# Patient Record
Sex: FEMALE | Race: WHITE | NOT HISPANIC OR LATINO
[De-identification: names, ages, dates, MRNs, and addresses within clinical notes are randomized per-mention and may not be internally consistent; named-entity substitution may affect disease eponyms.]

---

## 2017-09-21 ENCOUNTER — APPOINTMENT (OUTPATIENT)
Dept: OBGYN | Facility: CLINIC | Age: 42
End: 2017-09-21

## 2018-03-05 ENCOUNTER — APPOINTMENT (OUTPATIENT)
Dept: OBGYN | Facility: CLINIC | Age: 43
End: 2018-03-05
Payer: COMMERCIAL

## 2018-03-05 VITALS
BODY MASS INDEX: 26.37 KG/M2 | HEIGHT: 67 IN | DIASTOLIC BLOOD PRESSURE: 76 MMHG | SYSTOLIC BLOOD PRESSURE: 110 MMHG | WEIGHT: 168 LBS

## 2018-03-05 DIAGNOSIS — N92.1 EXCESSIVE AND FREQUENT MENSTRUATION WITH IRREGULAR CYCLE: ICD-10-CM

## 2018-03-05 PROCEDURE — 99213 OFFICE O/P EST LOW 20 MIN: CPT

## 2018-03-06 ENCOUNTER — APPOINTMENT (OUTPATIENT)
Dept: OBGYN | Facility: CLINIC | Age: 43
End: 2018-03-06
Payer: COMMERCIAL

## 2018-03-06 ENCOUNTER — ASOB RESULT (OUTPATIENT)
Age: 43
End: 2018-03-06

## 2018-03-06 VITALS
HEIGHT: 67 IN | SYSTOLIC BLOOD PRESSURE: 101 MMHG | WEIGHT: 168 LBS | DIASTOLIC BLOOD PRESSURE: 68 MMHG | BODY MASS INDEX: 26.37 KG/M2 | HEART RATE: 80 BPM

## 2018-03-06 PROCEDURE — 76830 TRANSVAGINAL US NON-OB: CPT

## 2018-03-06 PROCEDURE — 76857 US EXAM PELVIC LIMITED: CPT

## 2018-03-06 PROCEDURE — 58558Z: CUSTOM

## 2018-03-08 LAB — CORE LAB BIOPSY: NORMAL

## 2018-03-15 ENCOUNTER — OUTPATIENT (OUTPATIENT)
Dept: OUTPATIENT SERVICES | Facility: HOSPITAL | Age: 43
LOS: 1 days | End: 2018-03-15
Payer: COMMERCIAL

## 2018-03-15 DIAGNOSIS — Z98.89 OTHER SPECIFIED POSTPROCEDURAL STATES: Chronic | ICD-10-CM

## 2018-03-15 DIAGNOSIS — Z01.818 ENCOUNTER FOR OTHER PREPROCEDURAL EXAMINATION: ICD-10-CM

## 2018-03-15 LAB
ANION GAP SERPL CALC-SCNC: 11 MMOL/L — SIGNIFICANT CHANGE UP (ref 5–17)
ANISOCYTOSIS BLD QL: SLIGHT — SIGNIFICANT CHANGE UP
APPEARANCE UR: CLEAR — SIGNIFICANT CHANGE UP
BILIRUB UR-MCNC: NEGATIVE — SIGNIFICANT CHANGE UP
BUN SERPL-MCNC: 15 MG/DL — SIGNIFICANT CHANGE UP (ref 8–20)
CALCIUM SERPL-MCNC: 8.9 MG/DL — SIGNIFICANT CHANGE UP (ref 8.6–10.2)
CHLORIDE SERPL-SCNC: 106 MMOL/L — SIGNIFICANT CHANGE UP (ref 98–107)
CO2 SERPL-SCNC: 25 MMOL/L — SIGNIFICANT CHANGE UP (ref 22–29)
COLOR SPEC: YELLOW — SIGNIFICANT CHANGE UP
CREAT SERPL-MCNC: 0.76 MG/DL — SIGNIFICANT CHANGE UP (ref 0.5–1.3)
DIFF PNL FLD: NEGATIVE — SIGNIFICANT CHANGE UP
EOSINOPHIL NFR BLD AUTO: 3 % — SIGNIFICANT CHANGE UP (ref 0–5)
GLUCOSE SERPL-MCNC: 87 MG/DL — SIGNIFICANT CHANGE UP (ref 70–115)
GLUCOSE UR QL: NEGATIVE MG/DL — SIGNIFICANT CHANGE UP
HCG UR QL: NEGATIVE — SIGNIFICANT CHANGE UP
HCT VFR BLD CALC: 35.6 % — LOW (ref 37–47)
HGB BLD-MCNC: 11.2 G/DL — LOW (ref 12–16)
KETONES UR-MCNC: NEGATIVE — SIGNIFICANT CHANGE UP
LEUKOCYTE ESTERASE UR-ACNC: NEGATIVE — SIGNIFICANT CHANGE UP
LYMPHOCYTES # BLD AUTO: 21 % — SIGNIFICANT CHANGE UP (ref 20–55)
MACROCYTES BLD QL: SLIGHT — SIGNIFICANT CHANGE UP
MCHC RBC-ENTMCNC: 28.4 PG — SIGNIFICANT CHANGE UP (ref 27–31)
MCHC RBC-ENTMCNC: 31.5 G/DL — LOW (ref 32–36)
MCV RBC AUTO: 90.4 FL — SIGNIFICANT CHANGE UP (ref 81–99)
MICROCYTES BLD QL: SLIGHT — SIGNIFICANT CHANGE UP
MONOCYTES NFR BLD AUTO: 10 % — SIGNIFICANT CHANGE UP (ref 3–10)
NEUTROPHILS NFR BLD AUTO: 65 % — SIGNIFICANT CHANGE UP (ref 37–73)
NITRITE UR-MCNC: NEGATIVE — SIGNIFICANT CHANGE UP
OVALOCYTES BLD QL SMEAR: SLIGHT — SIGNIFICANT CHANGE UP
PH UR: 8 — SIGNIFICANT CHANGE UP (ref 5–8)
PLAT MORPH BLD: NORMAL — SIGNIFICANT CHANGE UP
PLATELET # BLD AUTO: 165 K/UL — SIGNIFICANT CHANGE UP (ref 150–400)
POIKILOCYTOSIS BLD QL AUTO: SLIGHT — SIGNIFICANT CHANGE UP
POTASSIUM SERPL-MCNC: 4.2 MMOL/L — SIGNIFICANT CHANGE UP (ref 3.5–5.3)
POTASSIUM SERPL-SCNC: 4.2 MMOL/L — SIGNIFICANT CHANGE UP (ref 3.5–5.3)
PROT UR-MCNC: NEGATIVE MG/DL — SIGNIFICANT CHANGE UP
RBC # BLD: 3.94 M/UL — LOW (ref 4.4–5.2)
RBC # FLD: 14.3 % — SIGNIFICANT CHANGE UP (ref 11–15.6)
RBC BLD AUTO: ABNORMAL
SODIUM SERPL-SCNC: 142 MMOL/L — SIGNIFICANT CHANGE UP (ref 135–145)
SP GR SPEC: 1.01 — SIGNIFICANT CHANGE UP (ref 1.01–1.02)
UROBILINOGEN FLD QL: NEGATIVE MG/DL — SIGNIFICANT CHANGE UP
VARIANT LYMPHS # BLD: 1 % — SIGNIFICANT CHANGE UP (ref 0–6)
WBC # BLD: 2.3 K/UL — LOW (ref 4.8–10.8)
WBC # FLD AUTO: 2.3 K/UL — LOW (ref 4.8–10.8)

## 2018-03-15 PROCEDURE — G0463: CPT

## 2018-03-15 PROCEDURE — 93005 ELECTROCARDIOGRAM TRACING: CPT

## 2018-03-15 PROCEDURE — 81003 URINALYSIS AUTO W/O SCOPE: CPT

## 2018-03-15 PROCEDURE — 93010 ELECTROCARDIOGRAM REPORT: CPT

## 2018-03-15 PROCEDURE — 80048 BASIC METABOLIC PNL TOTAL CA: CPT

## 2018-03-15 PROCEDURE — 81025 URINE PREGNANCY TEST: CPT

## 2018-03-15 PROCEDURE — 36415 COLL VENOUS BLD VENIPUNCTURE: CPT

## 2018-03-15 PROCEDURE — 85027 COMPLETE CBC AUTOMATED: CPT

## 2018-03-21 ENCOUNTER — RESULT REVIEW (OUTPATIENT)
Age: 43
End: 2018-03-21

## 2018-09-18 ENCOUNTER — APPOINTMENT (OUTPATIENT)
Dept: OBGYN | Facility: CLINIC | Age: 43
End: 2018-09-18
Payer: COMMERCIAL

## 2018-09-18 VITALS
HEIGHT: 67 IN | WEIGHT: 170 LBS | BODY MASS INDEX: 26.68 KG/M2 | SYSTOLIC BLOOD PRESSURE: 110 MMHG | HEART RATE: 77 BPM | DIASTOLIC BLOOD PRESSURE: 81 MMHG

## 2018-09-18 DIAGNOSIS — R39.9 UNSPECIFIED SYMPTOMS AND SIGNS INVOLVING THE GENITOURINARY SYSTEM: ICD-10-CM

## 2018-09-18 PROCEDURE — 81003 URINALYSIS AUTO W/O SCOPE: CPT | Mod: QW

## 2018-09-18 PROCEDURE — 99213 OFFICE O/P EST LOW 20 MIN: CPT

## 2018-09-20 LAB — BACTERIA UR CULT: NORMAL

## 2020-04-28 ENCOUNTER — APPOINTMENT (OUTPATIENT)
Dept: ENDOCRINOLOGY | Facility: CLINIC | Age: 45
End: 2020-04-28

## 2021-02-08 ENCOUNTER — APPOINTMENT (OUTPATIENT)
Dept: OBGYN | Facility: CLINIC | Age: 46
End: 2021-02-08
Payer: COMMERCIAL

## 2021-02-08 VITALS
BODY MASS INDEX: 26.68 KG/M2 | SYSTOLIC BLOOD PRESSURE: 120 MMHG | WEIGHT: 170 LBS | DIASTOLIC BLOOD PRESSURE: 80 MMHG | HEIGHT: 67 IN

## 2021-02-08 DIAGNOSIS — N64.4 MASTODYNIA: ICD-10-CM

## 2021-02-08 PROCEDURE — 99072 ADDL SUPL MATRL&STAF TM PHE: CPT

## 2021-02-08 PROCEDURE — 99214 OFFICE O/P EST MOD 30 MIN: CPT

## 2021-02-08 NOTE — REVIEW OF SYSTEMS
[Breast Pain] : breast pain [Nipple Changes] : no nipple changes [Nipple Discharge] : no nipple discharge [Negative] : Heme/Lymph

## 2021-02-08 NOTE — PLAN
[FreeTextEntry1] : Multiple small nodules appreciated on CBE, with bilateral tenderness.  Advised Pt of well fitting sports bra when exercising (impact exercise) and a mammogram with RFX US Rx was handed to the Pt and will be followed up.  She will be contacted with the results.  All questions addressed.

## 2021-02-08 NOTE — HISTORY OF PRESENT ILLNESS
[FreeTextEntry1] : 45 year old female presenting to office for bilateral breast discomfort.  She has not been seen in office since 2018 and has not had a mammogram in several years time.  No family Hx of breast CA, but her mother and sister both have fibrocystic breasts.  No abnormal discharge from nipples.  She does exercise on an elliptic and wears a sports bra.

## 2021-04-22 ENCOUNTER — NON-APPOINTMENT (OUTPATIENT)
Age: 46
End: 2021-04-22

## 2021-04-22 ENCOUNTER — APPOINTMENT (OUTPATIENT)
Dept: OPHTHALMOLOGY | Facility: CLINIC | Age: 46
End: 2021-04-22
Payer: COMMERCIAL

## 2021-04-22 PROCEDURE — 92133 CPTRZD OPH DX IMG PST SGM ON: CPT

## 2021-04-22 PROCEDURE — 92083 EXTENDED VISUAL FIELD XM: CPT

## 2021-04-22 PROCEDURE — 99072 ADDL SUPL MATRL&STAF TM PHE: CPT

## 2021-04-22 PROCEDURE — 92004 COMPRE OPH EXAM NEW PT 1/>: CPT

## 2021-04-22 PROCEDURE — 92020 GONIOSCOPY: CPT

## 2021-10-26 ENCOUNTER — APPOINTMENT (OUTPATIENT)
Dept: OPHTHALMOLOGY | Facility: CLINIC | Age: 46
End: 2021-10-26

## 2022-03-28 ENCOUNTER — APPOINTMENT (OUTPATIENT)
Dept: OBGYN | Facility: CLINIC | Age: 47
End: 2022-03-28

## 2022-04-13 ENCOUNTER — APPOINTMENT (OUTPATIENT)
Dept: OBGYN | Facility: CLINIC | Age: 47
End: 2022-04-13

## 2022-09-20 ENCOUNTER — APPOINTMENT (OUTPATIENT)
Dept: OPHTHALMOLOGY | Facility: CLINIC | Age: 47
End: 2022-09-20

## 2023-04-18 ENCOUNTER — APPOINTMENT (OUTPATIENT)
Dept: OBGYN | Facility: CLINIC | Age: 48
End: 2023-04-18
Payer: COMMERCIAL

## 2023-04-18 VITALS
DIASTOLIC BLOOD PRESSURE: 69 MMHG | HEIGHT: 67 IN | SYSTOLIC BLOOD PRESSURE: 105 MMHG | HEART RATE: 52 BPM | WEIGHT: 172 LBS | BODY MASS INDEX: 27 KG/M2

## 2023-04-18 DIAGNOSIS — Z01.419 ENCOUNTER FOR GYNECOLOGICAL EXAMINATION (GENERAL) (ROUTINE) W/OUT ABNORMAL FINDINGS: ICD-10-CM

## 2023-04-18 PROCEDURE — 99396 PREV VISIT EST AGE 40-64: CPT

## 2023-04-18 PROCEDURE — 99213 OFFICE O/P EST LOW 20 MIN: CPT | Mod: 25

## 2023-04-18 NOTE — DISCUSSION/SUMMARY
[FreeTextEntry1] : Pap smear is performed.  Prescription for mammography was provided.\par \par Regarding her uterine prolapse I had an extensive discussion.  Patient has third-degree uterine prolapse and I discussed her options including expectant management versus considering a pessary versus considering surgery.  Pros and cons of each modality were discussed including with a pessary the advantage of being avoidance of surgery but possible development of discharge etc.  For now patient will return for sonogram to assess her uterus and adnexa and will consider her options and advise me.  I also discussed referral to urogynecology if she considers incontinence and issue so they could consider a sling at the time of her surgery if she wishes to have surgery

## 2023-04-18 NOTE — HISTORY OF PRESENT ILLNESS
[FreeTextEntry1] : 48-year-old white female para 1 presents for annual visit as well as with complaint of something falling out.  Patient does not menstruate secondary to a NovaSure ablation in 2018.  She is sexually active with her  and just using withdrawal.  She also occasionally reports incontinence of urine but does not consider an issue.\par \par She has a medical history of seizure disorder and is on multiple medications for this.  Surgical history of breast lift, abdominoplasty and NovaSure ablation.  OB history significant for 1 vaginal delivery.  She is .  She denies tobacco occasional alcohol use no drug use.\par \par Patient has not had a mammogram.  She denies any family history of breast, ovarian, colon, uterine CA.

## 2023-04-19 LAB — HPV HIGH+LOW RISK DNA PNL CVX: NOT DETECTED

## 2023-04-23 LAB — CYTOLOGY CVX/VAG DOC THIN PREP: NORMAL

## 2023-04-25 ENCOUNTER — APPOINTMENT (OUTPATIENT)
Dept: MAMMOGRAPHY | Facility: CLINIC | Age: 48
End: 2023-04-25

## 2023-05-08 ENCOUNTER — APPOINTMENT (OUTPATIENT)
Dept: ANTEPARTUM | Facility: CLINIC | Age: 48
End: 2023-05-08
Payer: COMMERCIAL

## 2023-05-08 ENCOUNTER — ASOB RESULT (OUTPATIENT)
Age: 48
End: 2023-05-08

## 2023-05-08 ENCOUNTER — APPOINTMENT (OUTPATIENT)
Dept: OBGYN | Facility: CLINIC | Age: 48
End: 2023-05-08
Payer: COMMERCIAL

## 2023-05-08 VITALS — WEIGHT: 172 LBS | HEIGHT: 67 IN | BODY MASS INDEX: 27 KG/M2

## 2023-05-08 DIAGNOSIS — N81.4 UTEROVAGINAL PROLAPSE, UNSPECIFIED: ICD-10-CM

## 2023-05-08 PROCEDURE — 76830 TRANSVAGINAL US NON-OB: CPT

## 2023-05-08 PROCEDURE — 76856 US EXAM PELVIC COMPLETE: CPT | Mod: 59

## 2023-05-08 PROCEDURE — 99214 OFFICE O/P EST MOD 30 MIN: CPT

## 2023-05-08 NOTE — HISTORY OF PRESENT ILLNESS
[FreeTextEntry1] : 48-year-old white female para 1 here for follow-up visit for uterine prolapse.  Patient is here for pelvic sonogram and here with her  to discuss management options.  Patient does report occasional stress incontinence.\par \par Ultrasound today reveals a 8.6 x 4.7 cm uterus with two 1 cm intramural myomas.  Both adnexa appear normal.

## 2023-05-08 NOTE — DISCUSSION/SUMMARY
[FreeTextEntry1] : I discussed the findings of the sonogram with the patient.  I discussed fibroids; they are high incidence with extremely low likelihood of malignancy.\par \par Regarding her incontinence and prolapse we had a discussion.  I discussed management options including considering expectant management with no therapy; considering a pessary versus considering surgical options.  Everything was discussed extensively with pros and cons.  With the pessary I discussed learning how to change it yourself to prevent vaginal discharge etc.  Patient adamantly opposed to a pessary but has been wishes for her to try.  In terms of surgical options I discussed in light of her incontinence referral to urogynecology for potential sling procedure at the time of her TVH.  For now patient will consider her options and advise me.  Referral to urogynecology was given.

## 2023-05-25 ENCOUNTER — APPOINTMENT (OUTPATIENT)
Dept: OBGYN | Facility: CLINIC | Age: 48
End: 2023-05-25

## 2023-08-03 ENCOUNTER — APPOINTMENT (OUTPATIENT)
Dept: UROGYNECOLOGY | Facility: CLINIC | Age: 48
End: 2023-08-03

## 2023-08-14 ENCOUNTER — APPOINTMENT (OUTPATIENT)
Dept: UROGYNECOLOGY | Facility: CLINIC | Age: 48
End: 2023-08-14
Payer: COMMERCIAL

## 2023-08-14 PROCEDURE — 51729 CYSTOMETROGRAM W/VP&UP: CPT

## 2023-08-14 PROCEDURE — 51784 ANAL/URINARY MUSCLE STUDY: CPT

## 2023-08-14 PROCEDURE — 51797 INTRAABDOMINAL PRESSURE TEST: CPT

## 2023-08-14 PROCEDURE — 51741 ELECTRO-UROFLOWMETRY FIRST: CPT

## 2023-08-29 PROBLEM — Z86.69 HISTORY OF MENIERE'S DISEASE: Status: RESOLVED | Noted: 2023-08-29 | Resolved: 2023-08-29

## 2023-08-29 PROBLEM — Z78.9 CAFFEINE USE: Status: ACTIVE | Noted: 2023-08-29

## 2023-08-29 PROBLEM — Z86.69 HISTORY OF RESTLESS LEGS SYNDROME: Status: RESOLVED | Noted: 2023-08-29 | Resolved: 2023-08-29

## 2023-08-29 PROBLEM — N81.9 FEMALE GENITAL PROLAPSE, UNSPECIFIED TYPE: Status: ACTIVE | Noted: 2023-08-29

## 2023-08-29 RX ORDER — MULTIVITAMIN
TABLET ORAL
Refills: 0 | Status: ACTIVE | COMMUNITY

## 2023-08-29 RX ORDER — DILTIAZEM HCL 100 MG
VIAL (EA) INTRAVENOUS
Refills: 0 | Status: ACTIVE | COMMUNITY

## 2023-08-29 RX ORDER — MISOPROSTOL 200 UG/1
200 TABLET ORAL
Qty: 2 | Refills: 0 | Status: DISCONTINUED | COMMUNITY
Start: 2018-03-05 | End: 2023-08-29

## 2023-08-29 RX ORDER — CITALOPRAM 40 MG/1
40 TABLET, FILM COATED ORAL
Refills: 0 | Status: ACTIVE | COMMUNITY

## 2023-08-29 RX ORDER — NITROFURANTOIN (MONOHYDRATE/MACROCRYSTALS) 25; 75 MG/1; MG/1
100 CAPSULE ORAL TWICE DAILY
Qty: 10 | Refills: 0 | Status: DISCONTINUED | COMMUNITY
Start: 2018-09-18 | End: 2023-08-29

## 2023-08-30 ENCOUNTER — APPOINTMENT (OUTPATIENT)
Dept: UROGYNECOLOGY | Facility: CLINIC | Age: 48
End: 2023-08-30
Payer: COMMERCIAL

## 2023-08-30 VITALS
SYSTOLIC BLOOD PRESSURE: 121 MMHG | HEIGHT: 66 IN | BODY MASS INDEX: 27.48 KG/M2 | DIASTOLIC BLOOD PRESSURE: 74 MMHG | WEIGHT: 171 LBS

## 2023-08-30 DIAGNOSIS — Z86.69 PERSONAL HISTORY OF OTHER DISEASES OF THE NERVOUS SYSTEM AND SENSE ORGANS: ICD-10-CM

## 2023-08-30 DIAGNOSIS — Z78.9 OTHER SPECIFIED HEALTH STATUS: ICD-10-CM

## 2023-08-30 DIAGNOSIS — N81.9 FEMALE GENITAL PROLAPSE, UNSPECIFIED: ICD-10-CM

## 2023-08-30 PROCEDURE — 99214 OFFICE O/P EST MOD 30 MIN: CPT | Mod: 25

## 2023-08-30 PROCEDURE — 51701 INSERT BLADDER CATHETER: CPT | Mod: 59

## 2023-08-30 NOTE — PROCEDURE
[Straight Catheterization] : insertion of a straight catheter [Stress Incontinence] : stress incontinence [Urinary Frequency] : urinary frequency [___ Fr Straight Tip] : a [unfilled] in [place] straight tip catheter [None] : there were no complications with the catheter insertion [Clear] : clear [No Complications] : no complications [Tolerated Well] : the patient tolerated the procedure well [Post procedure instructions and information given] : Post procedure instructions and information were given and reviewed with patient. [1] : 1 [FreeTextEntry1] : cathed to obtain pvr and uncontam specimen

## 2023-08-30 NOTE — PHYSICAL EXAM
[Chaperone Present] : A chaperone was present in the examining room during all aspects of the physical examination [No Acute Distress] : in no acute distress [Oriented x3] : oriented to person, place, and time [None] : no CVA tenderness [Labia Majora] : were normal [Labia Minora] : were normal [Bartholin's Gland] : both Bartholin's glands were normal  [Normal Appearance] : general appearance was normal [No Bleeding] : there was no active vaginal bleeding [2] : 2 [Aa ____] : Aa [unfilled] [Ba ____] : Ba [unfilled] [C ____] : C [unfilled] [GH ____] : GH [unfilled] [PB ____] : PB [unfilled] [TVL ____] : TVL  [unfilled] [Ap ____] : Ap [unfilled] [Bp ____] : Bp [unfilled] [D ____] : D [unfilled] [] : I [Normal] : normal [Soft] :  the cervix was soft [Post Void Residual ____ml] : post void residual was [unfilled] ml [Exam Deferred] : was deferred [Tenderness] : ~T no ~M abdominal tenderness observed [Distended] : not distended [FreeTextEntry3] : CST neg [de-identified] : POP all apical

## 2023-08-30 NOTE — OB HISTORY
[Total Preg ___] : : [unfilled] [Living ___] : [unfilled] (living) [Vaginal ___] : [unfilled] vaginal delivery(s) [unknown] : the patient is unsure of the date of her LMP [Last Pap Smear ___] : date of last pap smear was on [unfilled] [Sexually Active] : sexually active [Abnormal Pap Smear] : normal pap smear [Taking Estrogens] : is not taking estrogen replacement

## 2023-08-30 NOTE — ASSESSMENT
[FreeTextEntry1] : Stage II uterine POP. The patient has pelvic organ prolapse. Management options including observation, kegels with or without PT, biofeedback, pessary, and surgery were reviewed. Pessary care was reviewed. Surg options obliterative vs reconstructive, major vs minor, abd / robotic vs vaginal, with or without hysterectomy, with or without graft use discussed. She is interested in surg. RTO for cysto. Plan: [] cysto  [] PRA - RASCH/SCP > TVH/USLS, consider scars on abd, consider MUS due to symptoms but neg UDS

## 2023-08-30 NOTE — HISTORY OF PRESENT ILLNESS
[FreeTextEntry1] : Years of bothersome bulge and pressure from vagina. No intervention yet. Not desiring further fertility. No UI. Freq at times, leakage only with sneeze small and occasional-no pads. No UUI. No gross hematuria. Normal bowel movements. Seizure d/o, friend Annemarie here per request.  PSH appendectomy, brain surgery, scar removal, breast lift, mini-abdominoplasty PMH seizure d/o, Meniere's disease Not a smoker NKDA  UDS - normal PVR, normal low-end capacity, no DO, no GSUI. TVUS - normal ovs, no FF, ut 1 fib and ut 9 x 4 x 5 cm

## 2023-09-19 ENCOUNTER — APPOINTMENT (OUTPATIENT)
Dept: UROGYNECOLOGY | Facility: CLINIC | Age: 48
End: 2023-09-19
Payer: COMMERCIAL

## 2023-09-19 VITALS
DIASTOLIC BLOOD PRESSURE: 83 MMHG | WEIGHT: 171 LBS | HEART RATE: 51 BPM | OXYGEN SATURATION: 99 % | SYSTOLIC BLOOD PRESSURE: 134 MMHG | HEIGHT: 66 IN | BODY MASS INDEX: 27.48 KG/M2

## 2023-09-19 LAB
BILIRUB UR QL STRIP: NEGATIVE
CLARITY UR: CLEAR
GLUCOSE UR-MCNC: NEGATIVE
HCG UR QL: 0.2 EU/DL
HGB UR QL STRIP.AUTO: NEGATIVE
KETONES UR-MCNC: NEGATIVE
LEUKOCYTE ESTERASE UR QL STRIP: NEGATIVE
NITRITE UR QL STRIP: NEGATIVE
PH UR STRIP: 6.5
PROT UR STRIP-MCNC: NEGATIVE
SP GR UR STRIP: 1.01

## 2023-09-19 PROCEDURE — 99214 OFFICE O/P EST MOD 30 MIN: CPT

## 2023-09-19 PROCEDURE — 81003 URINALYSIS AUTO W/O SCOPE: CPT | Mod: QW

## 2023-09-29 ENCOUNTER — APPOINTMENT (OUTPATIENT)
Dept: UROGYNECOLOGY | Facility: CLINIC | Age: 48
End: 2023-09-29

## 2023-10-13 ENCOUNTER — NON-APPOINTMENT (OUTPATIENT)
Age: 48
End: 2023-10-13

## 2023-12-06 ENCOUNTER — APPOINTMENT (OUTPATIENT)
Dept: FAMILY MEDICINE | Facility: CLINIC | Age: 48
End: 2023-12-06
Payer: COMMERCIAL

## 2023-12-06 ENCOUNTER — NON-APPOINTMENT (OUTPATIENT)
Age: 48
End: 2023-12-06

## 2023-12-06 VITALS
RESPIRATION RATE: 16 BRPM | WEIGHT: 172 LBS | DIASTOLIC BLOOD PRESSURE: 69 MMHG | HEART RATE: 76 BPM | BODY MASS INDEX: 27.64 KG/M2 | TEMPERATURE: 97 F | HEIGHT: 66 IN | SYSTOLIC BLOOD PRESSURE: 108 MMHG | OXYGEN SATURATION: 98 %

## 2023-12-06 DIAGNOSIS — R32 UNSPECIFIED URINARY INCONTINENCE: ICD-10-CM

## 2023-12-06 DIAGNOSIS — Z76.89 PERSONS ENCOUNTERING HEALTH SERVICES IN OTHER SPECIFIED CIRCUMSTANCES: ICD-10-CM

## 2023-12-06 DIAGNOSIS — G40.909 EPILEPSY, UNSPECIFIED, NOT INTRACTABLE, W/OUT STATUS EPILEPTICUS: ICD-10-CM

## 2023-12-06 DIAGNOSIS — Z82.49 FAMILY HISTORY OF ISCHEMIC HEART DISEASE AND OTHER DISEASES OF THE CIRCULATORY SYSTEM: ICD-10-CM

## 2023-12-06 DIAGNOSIS — R35.0 FREQUENCY OF MICTURITION: ICD-10-CM

## 2023-12-06 DIAGNOSIS — Z13.31 ENCOUNTER FOR SCREENING FOR DEPRESSION: ICD-10-CM

## 2023-12-06 PROCEDURE — G0444 DEPRESSION SCREEN ANNUAL: CPT | Mod: 59

## 2023-12-06 PROCEDURE — 99203 OFFICE O/P NEW LOW 30 MIN: CPT | Mod: 25

## 2023-12-06 PROCEDURE — 96160 PT-FOCUSED HLTH RISK ASSMT: CPT | Mod: 59

## 2023-12-06 RX ORDER — LACOSAMIDE 200 MG/1
200 TABLET, FILM COATED ORAL
Refills: 0 | Status: ACTIVE | COMMUNITY
Start: 2023-12-06

## 2024-01-04 ENCOUNTER — NON-APPOINTMENT (OUTPATIENT)
Age: 49
End: 2024-01-04

## 2024-01-04 ENCOUNTER — APPOINTMENT (OUTPATIENT)
Dept: FAMILY MEDICINE | Facility: CLINIC | Age: 49
End: 2024-01-04
Payer: COMMERCIAL

## 2024-01-04 VITALS
TEMPERATURE: 98.3 F | DIASTOLIC BLOOD PRESSURE: 73 MMHG | OXYGEN SATURATION: 100 % | HEIGHT: 66 IN | RESPIRATION RATE: 16 BRPM | HEART RATE: 55 BPM | SYSTOLIC BLOOD PRESSURE: 110 MMHG

## 2024-01-04 DIAGNOSIS — Z00.00 ENCOUNTER FOR GENERAL ADULT MEDICAL EXAMINATION W/OUT ABNORMAL FINDINGS: ICD-10-CM

## 2024-01-04 DIAGNOSIS — Z13.220 ENCOUNTER FOR SCREENING FOR LIPOID DISORDERS: ICD-10-CM

## 2024-01-04 DIAGNOSIS — Z13.0 ENCOUNTER FOR SCREENING FOR DISEASES OF THE BLOOD AND BLOOD-FORMING ORGANS AND CERTAIN DISORDERS INVOLVING THE IMMUNE MECHANISM: ICD-10-CM

## 2024-01-04 PROCEDURE — 93000 ELECTROCARDIOGRAM COMPLETE: CPT

## 2024-01-04 PROCEDURE — 99396 PREV VISIT EST AGE 40-64: CPT | Mod: 25

## 2024-01-04 PROCEDURE — 96160 PT-FOCUSED HLTH RISK ASSMT: CPT

## 2024-01-04 NOTE — HEALTH RISK ASSESSMENT
[Good] : ~his/her~  mood as  good [Patient reported mammogram was normal] : Patient reported mammogram was normal [Patient reported PAP Smear was normal] : Patient reported PAP Smear was normal [None] : None [With Family] : lives with family [Employed] : employed [] :  [# Of Children ___] : has [unfilled] children [Feels Safe at Home] : Feels safe at home [Fully functional (bathing, dressing, toileting, transferring, walking, feeding)] : Fully functional (bathing, dressing, toileting, transferring, walking, feeding) [Reports normal functional visual acuity (ie: able to read med bottle)] : Reports normal functional visual acuity [Yes] : Yes [Monthly or less (1 pt)] : Monthly or less (1 point) [1 or 2 (0 pts)] : 1 or 2 (0 points) [Never (0 pts)] : Never (0 points) [No] : In the past 12 months have you used drugs other than those required for medical reasons? No [No falls in past year] : Patient reported no falls in the past year [Smoke Detector] : smoke detector [Carbon Monoxide Detector] : carbon monoxide detector [Guns at Home] : guns at home [Seat Belt] :  uses seat belt [Sunscreen] : uses sunscreen [de-identified] : n/a [de-identified] : HEME ([facility]), NEURO (Dr. Fischer)  [de-identified] : claudy,  [de-identified] : well-balanced,  [Change in mental status noted] : No change in mental status noted [Reports changes in hearing] : Reports no changes in hearing [Reports changes in vision] : Reports no changes in vision [Reports changes in dental health] : Reports no changes in dental health [MammogramDate] : 08/2023 [PapSmearDate] : 04/2023 [de-identified] : locked away safely [Never] : Never

## 2024-01-04 NOTE — COUNSELING
[FreeTextEntry1] : seizure precautions  [Behavioral health counseling provided] : Behavioral health counseling provided [Sleep ___ hours/day] : Sleep [unfilled] hours/day [Engage in a relaxing activity] : Engage in a relaxing activity [Plan in advance] : Plan in advance [Encouraged to maintain food diary] : Encouraged to maintain food diary [Encouraged to increase physical activity] : Encouraged to increase physical activity [____ min/wk Activity] : [unfilled] min/wk activity [Keep Food Diary] : keep food diary [None] : None [Good understanding] : Patient has a good understanding of lifestyle changes and steps needed to achieve self management goal

## 2024-01-04 NOTE — REVIEW OF SYSTEMS
[Dysuria] : no dysuria [Hematuria] : no hematuria [Muscle Pain] : no muscle pain [Headache] : no headache [Anxiety] : no anxiety [Depression] : no depression [Swollen Glands] : no swollen glands [Negative] : Gastrointestinal

## 2024-01-04 NOTE — ASSESSMENT
[FreeTextEntry1] : Annual physical/wellness visit performed today Routine measurements including height, weight, BMI, and blood pressure performed. Medications reviewed and reconciled.  Tobacco, alcohol, and drug screening performed.  Annual depression screening performed.  Diet and exercise discussed.   In office EKG performed: Sinus karen at 54bpm, NAD, normal intervals, no acute ST/TW  changes noted.

## 2024-01-04 NOTE — HISTORY OF PRESENT ILLNESS
[FreeTextEntry1] : CPE [de-identified] : Ms. BRAEDEN COHEN is a 48 year old female who presents to the office for CPE  Diet and exercise discussed.

## 2024-01-04 NOTE — SIGNATURES
[TextEntry] : Shanae Monae D.O.  Family Medicine Physician [place] [address] [phone]

## 2024-01-26 NOTE — COUNSELING
Detail Level: Detailed [Breast Self Exam] : breast self exam Add 1585x Cpt? (Do Not Bill If You Billed For The Procedure Placing The Sutures. This Is An Add-On Code That Must Be Billed With An E/M Visit Code): No

## 2024-02-16 ENCOUNTER — APPOINTMENT (OUTPATIENT)
Dept: UROGYNECOLOGY | Facility: CLINIC | Age: 49
End: 2024-02-16

## 2024-02-28 ENCOUNTER — APPOINTMENT (OUTPATIENT)
Dept: UROGYNECOLOGY | Facility: CLINIC | Age: 49
End: 2024-02-28

## 2024-03-05 ENCOUNTER — RX RENEWAL (OUTPATIENT)
Age: 49
End: 2024-03-05

## 2024-03-21 ENCOUNTER — APPOINTMENT (OUTPATIENT)
Dept: OPHTHALMOLOGY | Facility: CLINIC | Age: 49
End: 2024-03-21
Payer: COMMERCIAL

## 2024-03-21 ENCOUNTER — APPOINTMENT (OUTPATIENT)
Dept: OPHTHALMOLOGY | Facility: CLINIC | Age: 49
End: 2024-03-21

## 2024-03-21 ENCOUNTER — NON-APPOINTMENT (OUTPATIENT)
Age: 49
End: 2024-03-21

## 2024-03-21 PROCEDURE — 92014 COMPRE OPH EXAM EST PT 1/>: CPT

## 2024-03-21 PROCEDURE — 92020 GONIOSCOPY: CPT

## 2024-05-10 ENCOUNTER — RX RENEWAL (OUTPATIENT)
Age: 49
End: 2024-05-10

## 2024-05-22 ENCOUNTER — OFFICE (OUTPATIENT)
Dept: URBAN - METROPOLITAN AREA CLINIC 115 | Facility: CLINIC | Age: 49
Setting detail: OPHTHALMOLOGY
End: 2024-05-22
Payer: COMMERCIAL

## 2024-05-22 DIAGNOSIS — H53.433: ICD-10-CM

## 2024-05-22 DIAGNOSIS — H35.362: ICD-10-CM

## 2024-05-22 PROCEDURE — 92004 COMPRE OPH EXAM NEW PT 1/>: CPT | Performed by: OPHTHALMOLOGY

## 2024-05-22 PROCEDURE — 92133 CPTRZD OPH DX IMG PST SGM ON: CPT | Performed by: OPHTHALMOLOGY

## 2024-05-22 PROCEDURE — 92083 EXTENDED VISUAL FIELD XM: CPT | Performed by: OPHTHALMOLOGY

## 2024-05-22 ASSESSMENT — CONFRONTATIONAL VISUAL FIELD TEST (CVF)
OD_FINDINGS: FULL
OS_FINDINGS: FULL

## 2024-06-17 RX ORDER — LEVOTHYROXINE SODIUM 0.09 MG/1
88 TABLET ORAL
Qty: 90 | Refills: 0 | Status: ACTIVE | COMMUNITY
Start: 1900-01-01 | End: 1900-01-01

## 2024-06-26 ENCOUNTER — OFFICE (OUTPATIENT)
Dept: URBAN - METROPOLITAN AREA CLINIC 115 | Facility: CLINIC | Age: 49
Setting detail: OPHTHALMOLOGY
End: 2024-06-26
Payer: COMMERCIAL

## 2024-06-26 DIAGNOSIS — H50.10: ICD-10-CM

## 2024-06-26 DIAGNOSIS — H35.362: ICD-10-CM

## 2024-06-26 DIAGNOSIS — H53.433: ICD-10-CM

## 2024-06-26 PROCEDURE — 92012 INTRM OPH EXAM EST PATIENT: CPT | Performed by: OPHTHALMOLOGY

## 2024-06-26 PROCEDURE — 92060 SENSORIMOTOR EXAMINATION: CPT | Performed by: OPHTHALMOLOGY

## 2024-06-26 PROCEDURE — 92083 EXTENDED VISUAL FIELD XM: CPT | Performed by: OPHTHALMOLOGY

## 2024-06-26 ASSESSMENT — CONFRONTATIONAL VISUAL FIELD TEST (CVF)
OD_FINDINGS: FULL
OS_FINDINGS: FULL

## 2024-07-18 ENCOUNTER — OUTPATIENT (OUTPATIENT)
Dept: OUTPATIENT SERVICES | Facility: HOSPITAL | Age: 49
LOS: 1 days | End: 2024-07-18
Payer: COMMERCIAL

## 2024-07-18 VITALS
HEART RATE: 62 BPM | SYSTOLIC BLOOD PRESSURE: 110 MMHG | RESPIRATION RATE: 16 BRPM | TEMPERATURE: 97 F | OXYGEN SATURATION: 97 % | WEIGHT: 167.55 LBS | DIASTOLIC BLOOD PRESSURE: 74 MMHG | HEIGHT: 66 IN

## 2024-07-18 DIAGNOSIS — Z98.890 OTHER SPECIFIED POSTPROCEDURAL STATES: Chronic | ICD-10-CM

## 2024-07-18 DIAGNOSIS — G40.909 EPILEPSY, UNSPECIFIED, NOT INTRACTABLE, WITHOUT STATUS EPILEPTICUS: ICD-10-CM

## 2024-07-18 DIAGNOSIS — Z98.89 OTHER SPECIFIED POSTPROCEDURAL STATES: Chronic | ICD-10-CM

## 2024-07-18 DIAGNOSIS — Z90.49 ACQUIRED ABSENCE OF OTHER SPECIFIED PARTS OF DIGESTIVE TRACT: Chronic | ICD-10-CM

## 2024-07-18 DIAGNOSIS — N39.3 STRESS INCONTINENCE (FEMALE) (MALE): ICD-10-CM

## 2024-07-18 DIAGNOSIS — N81.9 FEMALE GENITAL PROLAPSE, UNSPECIFIED: ICD-10-CM

## 2024-07-18 DIAGNOSIS — E03.9 HYPOTHYROIDISM, UNSPECIFIED: ICD-10-CM

## 2024-07-18 DIAGNOSIS — Z01.818 ENCOUNTER FOR OTHER PREPROCEDURAL EXAMINATION: ICD-10-CM

## 2024-07-18 DIAGNOSIS — Z29.9 ENCOUNTER FOR PROPHYLACTIC MEASURES, UNSPECIFIED: ICD-10-CM

## 2024-07-18 DIAGNOSIS — N81.6 RECTOCELE: ICD-10-CM

## 2024-07-18 LAB
A1C WITH ESTIMATED AVERAGE GLUCOSE RESULT: 5.3 % — SIGNIFICANT CHANGE UP (ref 4–5.6)
ALBUMIN SERPL ELPH-MCNC: 4.1 G/DL — SIGNIFICANT CHANGE UP (ref 3.3–5.2)
ALP SERPL-CCNC: 49 U/L — SIGNIFICANT CHANGE UP (ref 40–120)
ALT FLD-CCNC: 11 U/L — SIGNIFICANT CHANGE UP
ANION GAP SERPL CALC-SCNC: 9 MMOL/L — SIGNIFICANT CHANGE UP (ref 5–17)
APPEARANCE UR: CLEAR — SIGNIFICANT CHANGE UP
APTT BLD: 31.6 SEC — SIGNIFICANT CHANGE UP (ref 24.5–35.6)
AST SERPL-CCNC: 16 U/L — SIGNIFICANT CHANGE UP
BASOPHILS # BLD AUTO: 0.02 K/UL — SIGNIFICANT CHANGE UP (ref 0–0.2)
BASOPHILS NFR BLD AUTO: 0.8 % — SIGNIFICANT CHANGE UP (ref 0–2)
BILIRUB SERPL-MCNC: 0.3 MG/DL — LOW (ref 0.4–2)
BILIRUB UR-MCNC: NEGATIVE — SIGNIFICANT CHANGE UP
BLD GP AB SCN SERPL QL: SIGNIFICANT CHANGE UP
BUN SERPL-MCNC: 14.2 MG/DL — SIGNIFICANT CHANGE UP (ref 8–20)
CALCIUM SERPL-MCNC: 8.8 MG/DL — SIGNIFICANT CHANGE UP (ref 8.4–10.5)
CHLORIDE SERPL-SCNC: 102 MMOL/L — SIGNIFICANT CHANGE UP (ref 96–108)
CO2 SERPL-SCNC: 27 MMOL/L — SIGNIFICANT CHANGE UP (ref 22–29)
COLOR SPEC: YELLOW — SIGNIFICANT CHANGE UP
CREAT SERPL-MCNC: 0.86 MG/DL — SIGNIFICANT CHANGE UP (ref 0.5–1.3)
DIFF PNL FLD: NEGATIVE — SIGNIFICANT CHANGE UP
EGFR: 84 ML/MIN/1.73M2 — SIGNIFICANT CHANGE UP
EOSINOPHIL # BLD AUTO: 0.06 K/UL — SIGNIFICANT CHANGE UP (ref 0–0.5)
EOSINOPHIL NFR BLD AUTO: 2.5 % — SIGNIFICANT CHANGE UP (ref 0–6)
ESTIMATED AVERAGE GLUCOSE: 105 MG/DL — SIGNIFICANT CHANGE UP (ref 68–114)
GLUCOSE SERPL-MCNC: 82 MG/DL — SIGNIFICANT CHANGE UP (ref 70–99)
GLUCOSE UR QL: NEGATIVE MG/DL — SIGNIFICANT CHANGE UP
HCT VFR BLD CALC: 39.4 % — SIGNIFICANT CHANGE UP (ref 34.5–45)
HGB BLD-MCNC: 12.9 G/DL — SIGNIFICANT CHANGE UP (ref 11.5–15.5)
IMM GRANULOCYTES NFR BLD AUTO: 0 % — SIGNIFICANT CHANGE UP (ref 0–0.9)
INR BLD: 1.15 RATIO — SIGNIFICANT CHANGE UP (ref 0.85–1.18)
KETONES UR-MCNC: NEGATIVE MG/DL — SIGNIFICANT CHANGE UP
LEUKOCYTE ESTERASE UR-ACNC: NEGATIVE — SIGNIFICANT CHANGE UP
LYMPHOCYTES # BLD AUTO: 0.71 K/UL — LOW (ref 1–3.3)
LYMPHOCYTES # BLD AUTO: 29.3 % — SIGNIFICANT CHANGE UP (ref 13–44)
MCHC RBC-ENTMCNC: 31.5 PG — SIGNIFICANT CHANGE UP (ref 27–34)
MCHC RBC-ENTMCNC: 32.7 GM/DL — SIGNIFICANT CHANGE UP (ref 32–36)
MCV RBC AUTO: 96.3 FL — SIGNIFICANT CHANGE UP (ref 80–100)
MONOCYTES # BLD AUTO: 0.21 K/UL — SIGNIFICANT CHANGE UP (ref 0–0.9)
MONOCYTES NFR BLD AUTO: 8.7 % — SIGNIFICANT CHANGE UP (ref 2–14)
NEUTROPHILS # BLD AUTO: 1.42 K/UL — LOW (ref 1.8–7.4)
NEUTROPHILS NFR BLD AUTO: 58.7 % — SIGNIFICANT CHANGE UP (ref 43–77)
NITRITE UR-MCNC: NEGATIVE — SIGNIFICANT CHANGE UP
PH UR: 6 — SIGNIFICANT CHANGE UP (ref 5–8)
PLATELET # BLD AUTO: 122 K/UL — LOW (ref 150–400)
POTASSIUM SERPL-MCNC: 4.9 MMOL/L — SIGNIFICANT CHANGE UP (ref 3.5–5.3)
POTASSIUM SERPL-SCNC: 4.9 MMOL/L — SIGNIFICANT CHANGE UP (ref 3.5–5.3)
PROT SERPL-MCNC: 7.7 G/DL — SIGNIFICANT CHANGE UP (ref 6.6–8.7)
PROT UR-MCNC: NEGATIVE MG/DL — SIGNIFICANT CHANGE UP
PROTHROM AB SERPL-ACNC: 12.7 SEC — SIGNIFICANT CHANGE UP (ref 9.5–13)
RBC # BLD: 4.09 M/UL — SIGNIFICANT CHANGE UP (ref 3.8–5.2)
RBC # FLD: 13 % — SIGNIFICANT CHANGE UP (ref 10.3–14.5)
SODIUM SERPL-SCNC: 138 MMOL/L — SIGNIFICANT CHANGE UP (ref 135–145)
SP GR SPEC: 1.01 — SIGNIFICANT CHANGE UP (ref 1–1.03)
UROBILINOGEN FLD QL: 0.2 MG/DL — SIGNIFICANT CHANGE UP (ref 0.2–1)
WBC # BLD: 2.42 K/UL — LOW (ref 3.8–10.5)
WBC # FLD AUTO: 2.42 K/UL — LOW (ref 3.8–10.5)

## 2024-07-18 PROCEDURE — 85730 THROMBOPLASTIN TIME PARTIAL: CPT

## 2024-07-18 PROCEDURE — 87086 URINE CULTURE/COLONY COUNT: CPT

## 2024-07-18 PROCEDURE — 83036 HEMOGLOBIN GLYCOSYLATED A1C: CPT

## 2024-07-18 PROCEDURE — 80053 COMPREHEN METABOLIC PANEL: CPT

## 2024-07-18 PROCEDURE — 81003 URINALYSIS AUTO W/O SCOPE: CPT

## 2024-07-18 PROCEDURE — 86850 RBC ANTIBODY SCREEN: CPT

## 2024-07-18 PROCEDURE — G0463: CPT

## 2024-07-18 PROCEDURE — 36415 COLL VENOUS BLD VENIPUNCTURE: CPT

## 2024-07-18 PROCEDURE — 86901 BLOOD TYPING SEROLOGIC RH(D): CPT

## 2024-07-18 PROCEDURE — 85610 PROTHROMBIN TIME: CPT

## 2024-07-18 PROCEDURE — 86900 BLOOD TYPING SEROLOGIC ABO: CPT

## 2024-07-18 PROCEDURE — 85025 COMPLETE CBC W/AUTO DIFF WBC: CPT

## 2024-07-18 RX ORDER — ACETAMINOPHEN 500 MG
975 TABLET ORAL ONCE
Refills: 0 | Status: COMPLETED | OUTPATIENT
Start: 2024-08-07 | End: 2024-08-07

## 2024-07-18 RX ORDER — BACTERIOSTATIC SODIUM CHLORIDE 0.9 %
3 VIAL (ML) INJECTION ONCE
Refills: 0 | Status: DISCONTINUED | OUTPATIENT
Start: 2024-08-07 | End: 2024-08-07

## 2024-07-18 RX ORDER — CELECOXIB 200 MG/1
400 CAPSULE ORAL ONCE
Refills: 0 | Status: COMPLETED | OUTPATIENT
Start: 2024-08-07 | End: 2024-08-07

## 2024-07-18 RX ORDER — CEFAZOLIN SODIUM 10 G
2000 VIAL (EA) INJECTION ONCE
Refills: 0 | Status: DISCONTINUED | OUTPATIENT
Start: 2024-08-07 | End: 2024-08-07

## 2024-07-18 RX ORDER — METRONIDAZOLE 500 MG/1
500 TABLET ORAL ONCE
Refills: 0 | Status: DISCONTINUED | OUTPATIENT
Start: 2024-08-07 | End: 2024-08-07

## 2024-07-18 NOTE — H&P PST ADULT - NSICDXPROCEDURE_GEN_ALL_CORE_FT
PROCEDURES:  Robot-assisted laparoscopic supracervical hysterectomy with cystoscopy 18-Jul-2024 12:12:09  Genia Santos  Bilateral salpingectomy 18-Jul-2024 12:13:12  Genia Santos  Laparoscopic sacrocolpopexy with midurethral sling 18-Jul-2024 12:13:31  Genia Santos

## 2024-07-18 NOTE — H&P PST ADULT - NSICDXPASTMEDICALHX_GEN_ALL_CORE_FT
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

## 2024-07-18 NOTE — H&P PST ADULT - GASTROINTESTINAL
negative soft/nontender/nondistended/normal active bowel sounds/no guarding/no rigidity/no palpable sam

## 2024-07-18 NOTE — H&P PST ADULT - PROBLEM SELECTOR PLAN 3
-Patient instructed to take Citalopram, Vimpat, Zonigram, and Onfi  the morning of surgery with a sip of water  -Follow up with neurologist for routine management

## 2024-07-18 NOTE — H&P PST ADULT - HISTORY OF PRESENT ILLNESS
My uterus dropped experiencing increased urinary frequency, stress incontinence, mild dysuria, 1 UTI per year  uterine ablation 2019 for heavy menstrual bleeding - no cycle since     internal US revealed multiple uterine cysts ?    denies pain or bleeding after intercourse     My uterus dropped experiencing increased urinary frequency, stress incontinence, mild dysuria, 1 UTI per year  uterine ablation 2019 for heavy menstrual bleeding - no cycle since     internal US revealed multiple uterine cysts ?    denies pain or bleeding after intercourse  Last episode of seizure . Pt under the care of Dr. Boss (Neurologist) Jyotsna Ross is a 49 year old female with PMH of hypothyroidism, restless leg syndrome, epilepsy (last seizure ) and mesial temporal sclerosis s/p brain surgery (). Patient is , s/p uterine ablation in 2019 for heavy menstrual bleeding. Patient reports experiencing increased urinary frequency, stress incontinence, and mild dysuria for several years with progressive worsening. Pt admits to occasional UTI's but no more than 1 per year. Patient states she went to her GYN at which time an testing revealed a prolapsed uterus. Patient denies vaginal bleeding or discharge, dyspareunia, abdominal pain, constipation, n/v/d. Patient was seen today at PST with genital prolapse, rectocele, stress incontinence for scheduled robotic supracervical hysterectomy with bilateral salpingectomy, sacrocolpopexy, anterior and posterior repair, midurethral sling and cystoscopy with Dr. Osuna on 24.

## 2024-07-18 NOTE — H&P PST ADULT - NEUROLOGICAL
details… normal/cranial nerves II-XII intact/sensation intact/responds to verbal commands/no spontaneous movement

## 2024-07-18 NOTE — H&P PST ADULT - NSICDXPASTSURGICALHX_GEN_ALL_CORE_FT
PAST SURGICAL HISTORY:  S/P brain surgery      PAST SURGICAL HISTORY:  H/O abdominoplasty     History of appendectomy     History of breast lift     S/P brain surgery

## 2024-07-18 NOTE — H&P PST ADULT - NSICDXFAMILYHX_GEN_ALL_CORE_FT
FAMILY HISTORY:  Family history of MS (multiple sclerosis)  Family history of rheumatoid arthritis

## 2024-07-18 NOTE — H&P PST ADULT - ASSESSMENT
Jyotsna Ross is a 49 year old female, , with genital prolapse, rectocele, and stress incontinence scheduled for a robotic supracervical hysterectomy with bilateral salpingectomy, sacrocolpopexy, anterior and posterior repair, midurethral sling and cystoscopy with Dr. Osuna on 24.       -Medical and neurology evaluation pending  -Patient educated on ERP protocol (written/verbal)   -Educated on NSAIDS, multivitamins and herbals that increase the risk of bleeding and need to be stopped 5 days before procedure  -Patient instructed to take Citalopram, Vimpat, Zonigram, Onfi, and Levothyroxine the morning of surgery with a sip of water  -Educated on infection prevention  -Tylenol can be taken if needed for pain  -Will continue all other medications as prescribed  -Verbalized understanding of all instructions.    CAPRINI SCORE    AGE RELATED RISK FACTORS                                                             [x ] Age 41-60 years                                            (1 Point)  [ ] Age: 61-74 years                                           (2 Points)                 [ ] Age= 75 years                                                (3 Points)             DISEASE RELATED RISK FACTORS                                                       [ ] Edema in the lower extremities                 (1 Point)                     [ ] Varicose veins                                               (1 Point)                                 [ x] BMI > 25 Kg/m2                                            (1 Point)                                  [ ] Serious infection (ie PNA)                            (1 Point)                     [ ] Lung disease ( COPD, Emphysema)            (1 Point)                                                                          [ ] Acute myocardial infarction                         (1 Point)                  [ ] Congestive heart failure (in the previous month)  (1 Point)         [ ] Inflammatory bowel disease                            (1 Point)                  [ ] Central venous access, PICC or Port               (2 points)       (within the last month)                                                                [ ] Stroke (in the previous month)                        (5 Points)    [ ] Previous or present malignancy                       (2 points)                                                                                                                                                         HEMATOLOGY RELATED FACTORS                                                         [ ] Prior episodes of VTE                                     (3 Points)                     [ ] Positive family history for VTE                      (3 Points)                  [ ] Prothrombin 30745 A                                     (3 Points)                     [ ] Factor V Leiden                                                (3 Points)                        [ ] Lupus anticoagulants                                      (3 Points)                                                           [ ] Anticardiolipin antibodies                              (3 Points)                                                       [ ] High homocysteine in the blood                   (3 Points)                                             [ ] Other congenital or acquired thrombophilia      (3 Points)                                                [ ] Heparin induced thrombocytopenia                  (3 Points)                                        MOBILITY RELATED FACTORS  [ ] Bed rest                                                         (1 Point)  [ ] Plaster cast                                                    (2 points)  [ ] Bed bound for more than 72 hours           (2 Points)    GENDER SPECIFIC FACTORS  [ ] Pregnancy or had a baby within the last month   (1 Point)  [ ] Post-partum < 6 weeks                                   (1 Point)  [ ] Hormonal therapy  or oral contraception   (1 Point)  [ ] History of pregnancy complications              (1 point)  [ ] Unexplained or recurrent              (1 Point)    OTHER RISK FACTORS                                           (1 Point)  [ ] BMI >40, smoking, diabetes requiring insulin, chemotherapy  blood transfusions and length of surgery over 2 hours    SURGERY RELATED RISK FACTORS  [ ]  Section within the last month     (1 Point)  [ ] Minor surgery                                                  (1 Point)  [ ] Arthroscopic surgery                                       (2 Points)  [x ] Planned major surgery lasting more            (2 Points)      than 45 minutes     [ ] Elective hip or knee joint replacement       (5 points)       surgery                                                TRAUMA RELATED RISK FACTORS  [ ] Fracture of the hip, pelvis, or leg                       (5 Points)  [ ] Spinal cord injury resulting in paralysis             (5 points)       (in the previous month)    [ ] Paralysis  (less than 1 month)                             (5 Points)  [ ] Multiple Trauma within 1 month                        (5 Points)    Total Score [    4    ]    Caprini Score 0-2: Low Risk, NO VTE prophylaxis required for most patients, encourage ambulation  Caprini Score 3-6: Moderate Risk , pharmacologic VTE prophylaxis is indicated for most patients (in the absence of contraindications)  Caprini Score Greater than or =7: High risk, pharmocologic VTE prophylaxis indicated for most patients (in the absence of contraindications)    OPIOID RISK TOOL    HÉCTOR EACH BOX THAT APPLIES AND ADD TOTALS AT THE END    FAMILY HISTORY OF SUBSTANCE ABUSE                 FEMALE         MALE                                                Alcohol                             [  ]1 pt          [  ]3pts                                               Illegal Durgs                     [  ]2 pts        [  ]3pts                                               Rx Drugs                           [  ]4 pts        [  ]4 pts    PERSONAL HISTORY OF SUBSTANCE ABUSE                                                                                          Alcohol                             [  ]3 pts       [  ]3 pts                                               Illegal Drugs                     [  ]4 pts        [  ]4 pts                                               Rx Drugs                           [  ]5 pts        [  ]5 pts    AGE BETWEEN 16-45 YEARS                                      [  ]1 pt         [  ]1 pt    HISTORY OF PREADOLESCENT   SEXUAL ABUSE                                                             [  ]3 pts        [  ]0pts    PSYCHOLOGICAL DISEASE                     ADD, OCD, Bipolar, Schizophrenia        [  ]2 pts         [  ]2 pts                      Depression                                               [  ]1 pt           [  ]1 pt           SCORING TOTAL   (add numbers and type here)              (0)                                     A score of 3 or lower indicated LOW risk for future opioid abuse  A score of 4 to 7 indicated moderate risk for future opioid abuse  A score of 8 or higher indicates a high risk for opioid abuse

## 2024-07-18 NOTE — H&P PST ADULT - PROBLEM SELECTOR PLAN 2
Moderate Risk, Surgical team should assess /Strongly recommend pharmacological and mechanical measures for VTE prophylaxis

## 2024-07-18 NOTE — H&P PST ADULT - ATTENDING COMMENTS
Patient seen, continues to desires surgical correction of POP and declines nonsurg intervention. Proceed with RASCH / BS / SCP / possible APR / other indicated procedures / possible laparotomy. Risks of surgery including but not limited to risks of anesthesia, MI, stroke, cardiopulmonary arrest, aspiration, bleeding, hemorrhage, blood transfusion, platelet transfusion, hematoma formation, blood clot formation, infection, abscess formation, mesh-related complication such as erosion exposure or SBO, fistula formation, failure, recurrence, need for further surgery, OAB, urinary retention, going home with a catheter, hernia formation all discussed and she would like to proceed. Consent signed and witnessed, reviewed today and all questions answered with  Bill bedside per her request.     Jj Osuna MD  [phone] (cell)

## 2024-07-18 NOTE — H&P PST ADULT - PROBLEM SELECTOR PLAN 1
Patient is  scheduled for a robotic supracervical hysterectomy with bilateral salpingectomy, sacrocolpopexy, anterior and posterior repair, midurethral sling and cystoscopy with Dr. Osuna on 8/7/24.

## 2024-07-18 NOTE — H&P PST ADULT - PROBLEM SELECTOR PLAN 4
-Patient instructed to take Levothyroxine the morning of surgery with a sip of water  -Follow up with PCP for routine management

## 2024-07-20 LAB
CULTURE RESULTS: SIGNIFICANT CHANGE UP
SPECIMEN SOURCE: SIGNIFICANT CHANGE UP

## 2024-07-21 PROBLEM — Z01.818 PRE-OPERATIVE CLEARANCE: Status: ACTIVE | Noted: 2024-07-21

## 2024-07-23 ENCOUNTER — NON-APPOINTMENT (OUTPATIENT)
Age: 49
End: 2024-07-23

## 2024-07-23 ENCOUNTER — APPOINTMENT (OUTPATIENT)
Dept: FAMILY MEDICINE | Facility: CLINIC | Age: 49
End: 2024-07-23
Payer: COMMERCIAL

## 2024-07-23 VITALS
HEART RATE: 60 BPM | RESPIRATION RATE: 16 BRPM | TEMPERATURE: 97.9 F | DIASTOLIC BLOOD PRESSURE: 81 MMHG | OXYGEN SATURATION: 100 % | SYSTOLIC BLOOD PRESSURE: 127 MMHG

## 2024-07-23 DIAGNOSIS — Z01.818 ENCOUNTER FOR OTHER PREPROCEDURAL EXAMINATION: ICD-10-CM

## 2024-07-23 PROCEDURE — 93000 ELECTROCARDIOGRAM COMPLETE: CPT

## 2024-07-23 PROCEDURE — G2211 COMPLEX E/M VISIT ADD ON: CPT

## 2024-07-23 PROCEDURE — 99213 OFFICE O/P EST LOW 20 MIN: CPT

## 2024-07-23 NOTE — COUNSELING
[Behavioral health counseling provided] : behavioral health  [Sleep ___ hours/day] : Sleep [unfilled] hours/day [Engage in a relaxing activity] : Engage in a relaxing activity [None] : None

## 2024-07-24 ENCOUNTER — APPOINTMENT (OUTPATIENT)
Dept: UROGYNECOLOGY | Facility: CLINIC | Age: 49
End: 2024-07-24
Payer: COMMERCIAL

## 2024-07-24 PROCEDURE — 99214 OFFICE O/P EST MOD 30 MIN: CPT

## 2024-07-24 NOTE — ASSESSMENT
[FreeTextEntry1] : Symptoatic stage II POP. Obliterative vs reconstructive surgery discussed, minor vs major procedures discussed. Abdominal versus vaginal routes of surgery were reviewed. Abdominal surgery via robotic laparoscopy vs laparotomy discussed. Surgery with or without hysterectomy discussed. Surgery with or without graft use discussed. Efficacies, risks, and benefits reviewed.   Abdominally, a supracervical hysterectomy plus sacral colpopexy was discussed. Vaginally, a total hysterectomy plus uterosacral ligament suspension or sacrospinous fixation was discussed. A SONIDO versus total hysterectomy was discussed. With SONIDO, risk of cervical cancer in the future and need for routine pap smear surveillance was discussed. Benefits of SONIDO including decreased risk of mesh exposure was discussed. Bilateral salpingectomy was discussed to decrease the future risk of ovarian cancer. The benefit of oophorectomy to decrease the future risk of ovarian cancer or need for future ovarian surgery was discussed. The protective benefits of retaining ovaries for bone and cardiac health as well as decreased all-cause mortality rate was discussed.   The risks and benefits of surgery were discussed and included: risks of general anesthesia, MI, stroke, cardiopulmonary arrest, infection, abscess formation, bleeding, hematoma formation, hemorrhage, transfusion, blood clot formation, fistula formation, rejection, dyspareunia, chronic pain, neuropathy, damage to surrounding structures including but not limited to bowel/ureters/bladder/rectum/nerves/vessels, leakage of urine, voiding dysfunction, OAB, urinary retention, procedure failure, recurrence, need for further surgery, and going home with a catheter. With the use of mesh, risk of mesh erosion or exposure discussed. The FDA warning on transvaginally placed mesh for prolapse correction versus abdominally placed mesh or transvaginally placed mesh for midurethral sling was reviewed. Website for the FDA provided for information as desired. A pelvic exam under anesthesia was discussed and consented to as well. Perioperative care, anesthesia, NPO prior to the surgery, and postop precautions were reviewed. The presence of learners in the OR was discussed. All questions were answered. She understood and would like to proceed. Consent was signed and witnessed in the office.  Plan: [x] consent for EUA / RASCH / BS / SCP / cystourethroscopy / other indicated procedures / possible laparotomy for Aug in [facility] [] f/u med clearance recs/neuro [] PSTs

## 2024-07-24 NOTE — ASSESSMENT
[FreeTextEntry1] : Symptoatic stage II POP. Obliterative vs reconstructive surgery discussed, minor vs major procedures discussed. Abdominal versus vaginal routes of surgery were reviewed. Abdominal surgery via robotic laparoscopy vs laparotomy discussed. Surgery with or without hysterectomy discussed. Surgery with or without graft use discussed. Efficacies, risks, and benefits reviewed.   Abdominally, a supracervical hysterectomy plus sacral colpopexy was discussed. Vaginally, a total hysterectomy plus uterosacral ligament suspension or sacrospinous fixation was discussed. A SONIDO versus total hysterectomy was discussed. With SONIDO, risk of cervical cancer in the future and need for routine pap smear surveillance was discussed. Benefits of SONIDO including decreased risk of mesh exposure was discussed. Bilateral salpingectomy was discussed to decrease the future risk of ovarian cancer. The benefit of oophorectomy to decrease the future risk of ovarian cancer or need for future ovarian surgery was discussed. The protective benefits of retaining ovaries for bone and cardiac health as well as decreased all-cause mortality rate was discussed.   The risks and benefits of surgery were discussed and included: risks of general anesthesia, MI, stroke, cardiopulmonary arrest, infection, abscess formation, bleeding, hematoma formation, hemorrhage, transfusion, blood clot formation, fistula formation, rejection, dyspareunia, chronic pain, neuropathy, damage to surrounding structures including but not limited to bowel/ureters/bladder/rectum/nerves/vessels, leakage of urine, voiding dysfunction, OAB, urinary retention, procedure failure, recurrence, need for further surgery, and going home with a catheter. With the use of mesh, risk of mesh erosion or exposure discussed. The FDA warning on transvaginally placed mesh for prolapse correction versus abdominally placed mesh or transvaginally placed mesh for midurethral sling was reviewed. Website for the FDA provided for information as desired. A pelvic exam under anesthesia was discussed and consented to as well. Perioperative care, anesthesia, NPO prior to the surgery, and postop precautions were reviewed. The presence of learners in the OR was discussed. All questions were answered. She understood and would like to proceed. Consent was signed and witnessed in the office.  Plan: [x] consent for EUA / RASCH / BS / SCP / cystourethroscopy / other indicated procedures / possible laparotomy for Aug in [facility] [] f/u med clearance recs/neuro [] PSTs

## 2024-07-24 NOTE — HISTORY OF PRESENT ILLNESS
[FreeTextEntry1] : Symptomatic stage II uterine POP. Desires surgical correction of POP and declines nonsurg intervention of POP.  present today per her request. She understands that this is elective surgery.  PSH appendectomy, brain surgery, scar removal/revision from appendectomy scar, breast lift, abdominoplasty PMH seizure d/o, Meniere's disease Not a smoker NKDA  UDS - normal PVR, normal low-end capacity, no DO, no GSUI. Cysto - normal. TVUS - normal ovs, no FF, ut 1 fib and ut 9 x 4 x 5 cm

## 2024-07-24 NOTE — HISTORY OF PRESENT ILLNESS
Nico Mosquera is a 57 year old male who presents today for   Chief Complaint   Patient presents with   • Follow-up   Is in today for follow-up of medical problems and prescription refills and review of the labs  He has hyperlipidemia on Lipitor 10 mg daily no muscle pain is trying to monitor diet  He continued to have arthritic joint pain. He was seen for elbow pain and olecranon bursitis which has improved although not completely better. No further pain.  He has hypertension on Toprol-XL doing well no side effects no dizziness.  He has reflux for which he takes Nexium with good results.  He has sleep apnea is using BiPAP with good results  He is well compliant to his BiPAP  His concern about his obesity. He is trying to lose weight without much success.  Current Outpatient Prescriptions   Medication Sig   • cetirizine (ZYRTEC) 10 MG tablet Take 10 mg by mouth daily as needed for Allergies.   • Linoleic Acid-Sunflower Oil (CLA PO) Take 2 tablets by mouth 2 times daily. Congugated Linoleic Acid from Safflower Oil   • Omega-3 Fatty Acids (OMEGA 3 PO) Take 500 mg by mouth daily.   • ASTAXANTHIN PO Take by mouth daily.   • Fluticasone Propionate (FLONASE NA) Spray in each nostril daily.   • traMADol (ULTRAM) 50 MG tablet Take 1 tablet by mouth 2 times daily as needed for Pain.   • atorvastatin (LIPITOR) 10 MG tablet Take 1 tablet by mouth daily.   • diclofenac (VOLTAREN) 1 % gel Apply 2 g topically 4 times daily as needed (joint pain). Apply to the left knee .   • metoPROLOL (TOPROL-XL) 25 MG 24 hr tablet Take 1 tablet by mouth daily.   • meloxicam (MOBIC) 7.5 MG tablet Take 1 tablet by mouth daily.   • esomeprazole (NEXIUM) 40 MG capsule Take 1 capsule by mouth daily (before breakfast).   • cholecalciferol (VITAMIN D3) 1000 UNITS tablet Take 2 tablets by mouth daily.   • gabapentin (NEURONTIN) 300 MG capsule Take 300 mg by mouth nightly as needed. Indications: Restless Leg Syndrome   • FIBER TABS 600 MG OR    •  CENTRUM TABS   OR 1 TABLET DAILY     No current facility-administered medications for this visit.        ALLERGIES:   Allergen Reactions   • No Known Drug Allergies        Past Medical History:   Diagnosis Date   • Amputation finger    • Esophageal reflux    • Irritability and anger    • Joint pain    • Other and unspecified hyperlipidemia    • Restless leg syndrome     on gabapentin , prn at night   • Sleep apnea, obstructive, on bipap  8/12 98/2016    [facility]       Social History     Social History   • Marital status:      Spouse name: N/A   • Number of children: N/A   • Years of education: N/A     Occupational History   • Not on file.     Social History Main Topics   • Smoking status: Current Some Day Smoker     Types: Cigarettes   • Smokeless tobacco: Never Used      Comment: off and on smoker   • Alcohol use Yes   • Drug use: No   • Sexual activity: Not on file     Other Topics Concern   •  Service No     Social History Narrative    Manager of [employer]     , 2 boys               Family History   Problem Relation Age of Onset   • Lipids Father    • Hypertension Father        REVIEW OF SYSTEMS    Constitutional:  Patient denies fever, chills or tiredness.  Eyes:  Denies change in visual acuity.  HENT:  Denies sinus problems, ear infections, nasal congestion or sore throat.  Respiratory:  Denies cough or shortness of breath.   Cardiovascular:  Denies chest pain or edema.   Gastrointestinal:  Denies abdominal pain, nausea, or vomiting.  Genitourinary:  Denies painful urination, urinary frequency, or blood in urine.  Musculoskeletal:  Denies back pain or neck pain.  Integument:  Denies rash or itching.    PHYSICAL EXAM  Vital Signs:    Visit Vitals  /72 (BP Location: [facility], Patient Position: Sitting, Cuff Size: Large Adult)   Pulse 77   Temp 97.1 °F (36.2 °C) (Oral)   Ht 5' 7.5\" (1.715 m)   Wt 132.1 kg   SpO2 95%   BMI 44.94  [FreeTextEntry1] : Symptomatic stage II uterine POP. Desires surgical correction of POP and declines nonsurg intervention of POP.  present today per her request. She understands that this is elective surgery.  PSH appendectomy, brain surgery, scar removal/revision from appendectomy scar, breast lift, abdominoplasty PMH seizure d/o, Meniere's disease Not a smoker NKDA  UDS - normal PVR, normal low-end capacity, no DO, no GSUI. Cysto - normal. TVUS - normal ovs, no FF, ut 1 fib and ut 9 x 4 x 5 cm kg/m²     Constitutional:  No distress   Integument:  Warm. Dry. No rash.  HENT:  Normocephalic. Atraumatic. Bilateral external ears normal. Oropharynx moist. No oral exudates. Nose without lesion.  Neck:  No tenderness. Supple. No thyromegaly. No palpable cervical or supraclavicular nodes.  Eyes:  Conjunctivae normal. No discharge. Eyelids normal.  Cardiovascular:  S1, S2. Normal rhythm. No murmurs.   Respiratory:  No respiratory distress. No crackles or wheezes.  Gastrointestinal:  Bowel sounds normal. Soft. No tenderness. No masses. No guarding. Obesity noted  Neurologic:  Alert and oriented x3. Normal recent and remote memory.  Extremities:  No edema. Arthritic changes in knees, shoulder  Left elbow olecranon bursitis, better, still mild swelling but better      ASSESSMENT & PLAN    1. Benign essential HTN  Blood pressure is under okay control with Toprol-XL.  - CBC & AUTO DIFFERENTIAL; Future  - COMPREHENSIVE METABOLIC PANEL; Future  - URINALYSIS WITH MICRO & CULTURE IF INDICATED; Future    2. Gastroesophageal reflux disease without esophagitis  Continue Nexium and diet  3. HYPERCHOLESTEROLEM [PURE HYPERCHOLESTEROLEM]  Continue Lipitor and diet  - THYROID STIMULATING HORMONE REFLEX; Future  - LIPID PANEL WITH REFLEX; Future    4. Impaired fasting blood sugar  Encourage weight loss and walking and monitor diet  - GLYCOHEMOGLOBIN; Future    5. Sleep apnea, obstructive, on bipap  8/12  Continue BiPAP with good results encourage weight loss    6. Preventative health care  Check  - HEPATITIS C ANTIBODY WITH REFLEX; Future  - PROSTATE SPECIFIC ANTIGEN; Future    7. Vitamin D deficiency  Continue vitamin D supplement and monitor  - VITAMIN D -25 HYDROXY; Future  Adv otc stalin for wt loss  Labs reviewed. Questions answered. Medications refilled. Follow-up with fasting lab is scheduled.

## 2024-07-25 NOTE — RESULTS/DATA
[] : results reviewed [de-identified] : chronic low WBC- due to current seizure medication, pt will be obtaining hematologist clearance from [facility]- Dr. Pugh [de-identified] : Sinus bradycardia at 50bpm, low voltage. no changes from prior.

## 2024-07-25 NOTE — RESULTS/DATA
[] : results reviewed [de-identified] : chronic low WBC- due to current seizure medication, pt will be obtaining hematologist clearance from [facility]- Dr. Pugh [de-identified] : Sinus bradycardia at 50bpm, low voltage. no changes from prior.

## 2024-07-25 NOTE — SIGNATURES
[TextEntry] : Shanae Monae D.O.  Family Medicine [place] [address] [phone]

## 2024-07-25 NOTE — REVIEW OF SYSTEMS
[Negative] : Gastrointestinal [Dysuria] : no dysuria [Hematuria] : no hematuria [Muscle Pain] : no muscle pain [Headache] : no headache [Anxiety] : no anxiety [Depression] : no depression [Swollen Glands] : no swollen glands

## 2024-07-25 NOTE — HISTORY OF PRESENT ILLNESS
[No Adverse Anesthesia Reaction] : no adverse anesthesia reaction in self or family member [Moderate (4-6 METs)] : Moderate (4-6 METs) [Aortic Stenosis] : no aortic stenosis [Atrial Fibrillation] : no atrial fibrillation [Coronary Artery Disease] : no coronary artery disease [Recent Myocardial Infarction] : no recent myocardial infarction [Implantable Device/Pacemaker] : no implantable device/pacemaker [Asthma] : no asthma [COPD] : no COPD [Sleep Apnea] : no sleep apnea [Smoker] : not a smoker [Chronic Anticoagulation] : no chronic anticoagulation [Chronic Kidney Disease] : no chronic kidney disease [Diabetes] : no diabetes [(Patient denies any chest pain, claudication, dyspnea on exertion, orthopnea, palpitations or syncope)] : Patient denies any chest pain, claudication, dyspnea on exertion, orthopnea, palpitations or syncope [FreeTextEntry1] : robotic supracervical hysterectomy w/ b/l salpingectomy sacrocolpopexy, anterior and posterior repair, midurethral sling and cystoscopy [FreeTextEntry2] : 8/7/24 [FreeTextEntry3] : Dr. Osuna [FreeTextEntry4] : Ms. BRAEDEN COHEN is a 49 year old female who presents to the office for pre-operative evaluation

## 2024-07-25 NOTE — RESULTS/DATA
[] : results reviewed [de-identified] : chronic low WBC- due to current seizure medication, pt will be obtaining hematologist clearance from [facility]- Dr. Pugh [de-identified] : Sinus bradycardia at 50bpm, low voltage. no changes from prior.

## 2024-07-25 NOTE — ASSESSMENT
[High Risk Surgery - Intraperitoneal, Intrathoracic or Supringuinal Vascular Procedures] : High Risk Surgery - Intraperitoneal, Intrathoracic or Supringuinal Vascular Procedures - No (0) [Ischemic Heart Disease] : Ischemic Heart Disease - No (0) [Congestive Heart Failure] : Congestive Heart Failure - No (0) [Prior Cerebrovascular Accident or TIA] : Prior Cerebrovascular Accident or TIA - No (0) [Creatinine >= 2mg/dL (1 Point)] : Creatinine >= 2mg/dL - No (0) [Insulin-dependent Diabetic (1 Point)] : Insulin-dependent Diabetic - No (0) [0] : 0 , RCRI Class: I, Risk of Post-Op Cardiac Complications: 3.9%, 95% CI for Risk Estimate: 2.8% - 5.4% [Patient Optimized for Surgery] : Patient optimized for surgery [As per surgery] : as per surgery [FreeTextEntry4] : Patient is medically optimized for moderate risk procedure.  Pt has been cleared from Neurologist standpoint for procedure and follow Neuro recommendation in regards to seizure medications.  Pt will be obtaining hematologist clearance from Dr. Pugh ([facility]) as well.  Presurgical testing labs reviewed, EKG reviewed. Patient is not on any blood thinners

## 2024-07-25 NOTE — SIGNATURES
[TextEntry] : Shanae Monae D.O.  Family Medicine [place] [address] [phone]

## 2024-07-25 NOTE — SIGNATURES
[TextEntry] : Shanae Monae D.O.  Family Medicine [place] [address] [phone]

## 2024-08-06 ENCOUNTER — TRANSCRIPTION ENCOUNTER (OUTPATIENT)
Age: 49
End: 2024-08-06

## 2024-08-07 ENCOUNTER — INPATIENT (INPATIENT)
Facility: HOSPITAL | Age: 49
LOS: 0 days | Discharge: HOME | DRG: 761 | End: 2024-08-08
Attending: OBSTETRICS & GYNECOLOGY | Admitting: OBSTETRICS & GYNECOLOGY
Payer: COMMERCIAL

## 2024-08-07 ENCOUNTER — TRANSCRIPTION ENCOUNTER (OUTPATIENT)
Age: 49
End: 2024-08-07

## 2024-08-07 ENCOUNTER — APPOINTMENT (OUTPATIENT)
Dept: UROGYNECOLOGY | Facility: HOSPITAL | Age: 49
End: 2024-08-07

## 2024-08-07 ENCOUNTER — RESULT REVIEW (OUTPATIENT)
Age: 49
End: 2024-08-07

## 2024-08-07 VITALS
SYSTOLIC BLOOD PRESSURE: 107 MMHG | OXYGEN SATURATION: 99 % | HEIGHT: 66 IN | RESPIRATION RATE: 16 BRPM | HEART RATE: 57 BPM | WEIGHT: 167.55 LBS | DIASTOLIC BLOOD PRESSURE: 83 MMHG | TEMPERATURE: 98 F

## 2024-08-07 DIAGNOSIS — N39.3 STRESS INCONTINENCE (FEMALE) (MALE): ICD-10-CM

## 2024-08-07 DIAGNOSIS — Z98.890 OTHER SPECIFIED POSTPROCEDURAL STATES: Chronic | ICD-10-CM

## 2024-08-07 DIAGNOSIS — N81.9 FEMALE GENITAL PROLAPSE, UNSPECIFIED: ICD-10-CM

## 2024-08-07 DIAGNOSIS — N81.6 RECTOCELE: ICD-10-CM

## 2024-08-07 DIAGNOSIS — Z90.49 ACQUIRED ABSENCE OF OTHER SPECIFIED PARTS OF DIGESTIVE TRACT: Chronic | ICD-10-CM

## 2024-08-07 DIAGNOSIS — Z98.89 OTHER SPECIFIED POSTPROCEDURAL STATES: Chronic | ICD-10-CM

## 2024-08-07 PROCEDURE — 88307 TISSUE EXAM BY PATHOLOGIST: CPT | Mod: 26

## 2024-08-07 DEVICE — MESH UPSYLON Y: Type: IMPLANTABLE DEVICE | Status: FUNCTIONAL

## 2024-08-07 RX ORDER — FENTANYL CITRATE 1200 UG/1
25 LOZENGE ORAL; TRANSMUCOSAL
Refills: 0 | Status: DISCONTINUED | OUTPATIENT
Start: 2024-08-07 | End: 2024-08-07

## 2024-08-07 RX ORDER — CLOBAZAM 10 MG/1
20 TABLET ORAL AT BEDTIME
Refills: 0 | Status: DISCONTINUED | OUTPATIENT
Start: 2024-08-07 | End: 2024-08-08

## 2024-08-07 RX ORDER — GABAPENTIN 400 MG/1
300 CAPSULE ORAL EVERY 8 HOURS
Refills: 0 | Status: DISCONTINUED | OUTPATIENT
Start: 2024-08-07 | End: 2024-08-07

## 2024-08-07 RX ORDER — ZONISAMIDE 100 MG/1
300 CAPSULE ORAL
Refills: 0 | Status: DISCONTINUED | OUTPATIENT
Start: 2024-08-07 | End: 2024-08-08

## 2024-08-07 RX ORDER — LACOSAMIDE 50 MG/1
200 TABLET, FILM COATED ORAL
Refills: 0 | Status: DISCONTINUED | OUTPATIENT
Start: 2024-08-07 | End: 2024-08-08

## 2024-08-07 RX ORDER — ENOXAPARIN SODIUM 120 MG/.8ML
40 INJECTION SUBCUTANEOUS EVERY 24 HOURS
Refills: 0 | Status: DISCONTINUED | OUTPATIENT
Start: 2024-08-08 | End: 2024-08-08

## 2024-08-07 RX ORDER — ACETAMINOPHEN 500 MG
975 TABLET ORAL EVERY 6 HOURS
Refills: 0 | Status: DISCONTINUED | OUTPATIENT
Start: 2024-08-07 | End: 2024-08-08

## 2024-08-07 RX ORDER — DEXTROSE MONOHYDRATE, SODIUM CHLORIDE, SODIUM LACTATE, CALCIUM CHLORIDE, MAGNESIUM CHLORIDE 1.5; 538; 448; 18.4; 5.08 G/100ML; MG/100ML; MG/100ML; MG/100ML; MG/100ML
1000 SOLUTION INTRAPERITONEAL
Refills: 0 | Status: DISCONTINUED | OUTPATIENT
Start: 2024-08-07 | End: 2024-08-07

## 2024-08-07 RX ORDER — KETOROLAC TROMETHAMINE 10 MG
30 TABLET ORAL EVERY 8 HOURS
Refills: 0 | Status: COMPLETED | OUTPATIENT
Start: 2024-08-07 | End: 2024-08-08

## 2024-08-07 RX ORDER — ONDANSETRON HCL/PF 4 MG/2 ML
4 VIAL (ML) INJECTION ONCE
Refills: 0 | Status: COMPLETED | OUTPATIENT
Start: 2024-08-07 | End: 2024-08-07

## 2024-08-07 RX ORDER — LORATADINE 10 MG
17 TABLET,DISINTEGRATING ORAL AT BEDTIME
Refills: 0 | Status: DISCONTINUED | OUTPATIENT
Start: 2024-08-07 | End: 2024-08-08

## 2024-08-07 RX ORDER — DEXTROSE MONOHYDRATE, SODIUM CHLORIDE, SODIUM LACTATE, CALCIUM CHLORIDE, MAGNESIUM CHLORIDE 1.5; 538; 448; 18.4; 5.08 G/100ML; MG/100ML; MG/100ML; MG/100ML; MG/100ML
1000 SOLUTION INTRAPERITONEAL
Refills: 0 | Status: DISCONTINUED | OUTPATIENT
Start: 2024-08-07 | End: 2024-08-08

## 2024-08-07 RX ORDER — ACETAMINOPHEN 500 MG
1000 TABLET ORAL ONCE
Refills: 0 | Status: COMPLETED | OUTPATIENT
Start: 2024-08-07 | End: 2024-08-07

## 2024-08-07 RX ORDER — CLOBAZAM 10 MG/1
10 TABLET ORAL DAILY
Refills: 0 | Status: DISCONTINUED | OUTPATIENT
Start: 2024-08-07 | End: 2024-08-08

## 2024-08-07 RX ORDER — HYDROMORPHONE HCL IN 0.9% NACL 0.2 MG/ML
0.5 PLASTIC BAG, INJECTION (ML) INTRAVENOUS
Refills: 0 | Status: DISCONTINUED | OUTPATIENT
Start: 2024-08-07 | End: 2024-08-07

## 2024-08-07 RX ORDER — CLOBAZAM 10 MG/1
5 TABLET ORAL
Refills: 0 | Status: DISCONTINUED | OUTPATIENT
Start: 2024-08-07 | End: 2024-08-07

## 2024-08-07 RX ORDER — ONDANSETRON HCL/PF 4 MG/2 ML
8 VIAL (ML) INJECTION EVERY 8 HOURS
Refills: 0 | Status: DISCONTINUED | OUTPATIENT
Start: 2024-08-07 | End: 2024-08-08

## 2024-08-07 RX ORDER — LEVOTHYROXINE SODIUM 25 MCG
0 TABLET ORAL
Qty: 0 | Refills: 0 | DISCHARGE

## 2024-08-07 RX ORDER — SIMETHICONE 125 MG/1
80 TABLET, CHEWABLE ORAL EVERY 6 HOURS
Refills: 0 | Status: DISCONTINUED | OUTPATIENT
Start: 2024-08-07 | End: 2024-08-08

## 2024-08-07 RX ORDER — LEVOTHYROXINE SODIUM 175 MCG
88 TABLET ORAL DAILY
Refills: 0 | Status: DISCONTINUED | OUTPATIENT
Start: 2024-08-08 | End: 2024-08-08

## 2024-08-07 RX ORDER — CITALOPRAM HYDROBROMIDE 20 MG
40 TABLET ORAL DAILY
Refills: 0 | Status: DISCONTINUED | OUTPATIENT
Start: 2024-08-07 | End: 2024-08-08

## 2024-08-07 RX ORDER — GABAPENTIN 400 MG/1
600 CAPSULE ORAL AT BEDTIME
Refills: 0 | Status: DISCONTINUED | OUTPATIENT
Start: 2024-08-07 | End: 2024-08-08

## 2024-08-07 RX ADMIN — Medication 4 MILLIGRAM(S): at 18:49

## 2024-08-07 RX ADMIN — ZONISAMIDE 300 MILLIGRAM(S): 100 CAPSULE ORAL at 22:26

## 2024-08-07 RX ADMIN — CELECOXIB 400 MILLIGRAM(S): 200 CAPSULE ORAL at 11:23

## 2024-08-07 RX ADMIN — Medication 975 MILLIGRAM(S): at 11:24

## 2024-08-07 RX ADMIN — Medication 30 MILLIGRAM(S): at 22:25

## 2024-08-07 RX ADMIN — CLOBAZAM 20 MILLIGRAM(S): 10 TABLET ORAL at 22:27

## 2024-08-07 RX ADMIN — GABAPENTIN 600 MILLIGRAM(S): 400 CAPSULE ORAL at 22:26

## 2024-08-07 RX ADMIN — LACOSAMIDE 200 MILLIGRAM(S): 50 TABLET, FILM COATED ORAL at 22:27

## 2024-08-07 RX ADMIN — Medication 1000 MILLIGRAM(S): at 19:32

## 2024-08-07 RX ADMIN — Medication 400 MILLIGRAM(S): at 19:14

## 2024-08-07 NOTE — DISCHARGE NOTE PROVIDER - NSDCMRMEDTOKEN_GEN_ALL_CORE_FT
citalopram 40 mg oral tablet: 1 tab(s) orally once a day  folic acid 1 mg oral tablet: 1 tab(s) orally once a day  gabapentin 300 mg oral capsule: 2 cap(s) orally once a day (at bedtime)  lacosamide 200 mg oral tablet: 1 tab(s) orally 2 times a day  LEVOTHYROXIN 88MCG TAB:   Onfi: 5 milligram(s) orally 2 times a day  Zonegran: 300 milligram(s) orally 2 times a day   acetaminophen 325 mg oral tablet: 3 tab(s) orally every 6 hours  citalopram 40 mg oral tablet: 1 tab(s) orally once a day  folic acid 1 mg oral tablet: 1 tab(s) orally once a day  gabapentin 300 mg oral capsule: 2 cap(s) orally once a day (at bedtime)  lacosamide 200 mg oral tablet: 1 tab(s) orally 2 times a day  Onfi: 5 milligram(s) orally 2 times a day  Zonegran: 300 milligram(s) orally 2 times a day

## 2024-08-07 NOTE — DISCHARGE NOTE PROVIDER - CARE PROVIDER_API CALL
Miriam Osuna  Urogynecology - [facility]  [address] [place] [id number]  Phone: [phone]  Fax: [phone]  Established Patient  Follow Up Time: 1 week

## 2024-08-07 NOTE — BRIEF OPERATIVE NOTE - NSICDXBRIEFPOSTOP_GEN_ALL_CORE_FT
POST-OP DIAGNOSIS:  Uterine prolapse 07-Aug-2024 17:46:39  Fariha Whitman  Abdominal adhesions 07-Aug-2024 17:52:22  Fariha Whitman

## 2024-08-07 NOTE — BRIEF OPERATIVE NOTE - NSICDXBRIEFPROCEDURE_GEN_ALL_CORE_FT
PROCEDURES:  Robot-assisted laparoscopic supracervical hysterectomy with cystoscopy 07-Aug-2024 17:45:09  Fariha Whitman  Robot-assisted sacrocolpopexy 07-Aug-2024 17:45:27  Fariha Whitman  Robot-assisted bilateral salpingectomy 07-Aug-2024 17:45:46  Fariha Whitman  Exam under anesthesia, ear 07-Aug-2024 17:46:00  Fariha Whitman  Lysis, adhesions, abdominal, laparoscopic 07-Aug-2024 17:52:05  Fariha Whitman

## 2024-08-07 NOTE — BRIEF OPERATIVE NOTE - OPERATION/FINDINGS
EUA: normal external genitalia, mobile anteverted uterus, apical prolapse  LSC: filmy omental adhesions to anterior abdominal wall. Normal appearing uterus, bilateral fallopian tubes and bilateral ovaries. Appendix surgically absent.  Adhesiolysis was performed with a combination of sharp dissection and cautery. Robotic SONIDO and BS performed in the usual fashion. Robotic SCP performed in the usual fashion with mesh secured in place with suture. Cystoscopy revealed normal bladder without evidence of injury. Efflux of urine visualized from bilateral ureters. Hemostasis was noted at case end. EBL 200cc

## 2024-08-07 NOTE — DISCHARGE NOTE PROVIDER - NSDCFUSCHEDAPPT_GEN_ALL_CORE_FT
Kathy Luciano  [facility]  UROGYN [address]  Scheduled Appointment: 08/21/2024    Miriam Osuna  [facility]  UROGYN [address]  Scheduled Appointment: 09/25/2024     Kathy Luciano  [facility]  UROGYN [address]  Scheduled Appointment: 08/26/2024    Miriam Osuna  [facility]  UROGYN [address]  Scheduled Appointment: 09/25/2024

## 2024-08-07 NOTE — BRIEF OPERATIVE NOTE - ELECTIVE PROCEDURE
Is This A New Presentation, Or A Follow-Up?: Follow Up Rash
Additional History: Rechecking erosive pustulosis of the scalp,  scabs began to regrow 1 week ago, began treating with Fluocinonide gel. Patient requests refills.
Yes

## 2024-08-07 NOTE — DISCHARGE NOTE PROVIDER - HOSPITAL COURSE
Pt is s/p robotic-assisted supracervical hysterectomy, bilateral salpingectomy, lysis of adhesions, sacrocolpopexy, and cystoscopy. Her post-op course was uncomplicated. Upon discharge she was afebrile, hemodynamically stable. She is voiding, ambulating, tolerating PO diet, and her pain is well-controlled. Pt is s/p robotic-assisted supracervical hysterectomy, bilateral salpingectomy, lysis of adhesions, sacrocolpopexy, and cystoscopy. Her post-op course was complicated by asymptomatic episode of hypotension - stat labs were normal. Upon discharge she was afebrile, hemodynamically stable. She is voiding, ambulating, tolerating PO diet, and her pain is well-controlled.

## 2024-08-07 NOTE — DISCHARGE NOTE PROVIDER - NSDCCPCAREPLAN_GEN_ALL_CORE_FT
PRINCIPAL DISCHARGE DIAGNOSIS  Diagnosis: History of robot-assisted laparoscopic hysterectomy  Assessment and Plan of Treatment:       SECONDARY DISCHARGE DIAGNOSES  Diagnosis: S/P sacrocolpopexy  Assessment and Plan of Treatment:

## 2024-08-07 NOTE — CHART NOTE - NSCHARTNOTEFT_GEN_A_CORE
BRAEDEN COHEN is a 49y F s/p RA-SONIDO+BS, lysis of adhesions, SCP, cystoscopy    She states pain is well controlled with PRN medication  She is tolerating PO. She denies nausea/vomiting.   She has not yet ambulated or spontaneously voided. Valencia in place.    T(C): 36.5 (08-07-24 @ 19:59), Max: 36.5 (08-07-24 @ 06:30)  HR: 61 (08-07-24 @ 19:59) (56 - 73)  BP: 100/60 (08-07-24 @ 19:59) (95/67 - 107/83)  RR: 18 (08-07-24 @ 19:59) (14 - 22)  SpO2: 99% (08-07-24 @ 19:59) (96% - 100%)    General: well appearing; no distress  Cardiovascular: regular rate and rhythm, no murmurs, rubs or gallops appreciated  Respiratory: lungs clear to auscultation bilaterally  Abdominal: appropriately tender to palpation; incisions appear dry and intact with *  Pelvic: minimal spotting on pad  Extremities: no redness, tenderness, swelling or warmth on palpation    A/P:  Patient is s/p RA-SONIDO+BS, lysis of adhesions, SCP, cystoscopy, progressing appropriately post-operatively.     - Vital signs stable, afebrile, normotensive, normocardic   - Continue PRN ERAS pain medications  - Advance diet as tolerated  - Encouraged ambulation as tolerated and simethicone for flatus  - Valencia in place, plan for active TOV in AM  - DVT ppx: SCDs, Lovenox  - Home medications gabapentin 600 qhs, lacosamide 200 qd, synthroid 88 qd, citalopram 40 qd, zonegran 300 mg bid, Onfi 10 qam/20 qhs ordered  - Continue post- op management in AM BRAEDEN COHEN is a 49y F s/p RA-SONIDO+BS, lysis of adhesions, SCP, cystoscopy    She states pain is well controlled with PRN medication  She is tolerating PO. She denies nausea/vomiting.   She has ambulated without issue. She denies lightheadedness/dizziness. She has Valencia in place.    T(C): 36.5 (08-07-24 @ 19:59), Max: 36.5 (08-07-24 @ 06:30)  HR: 61 (08-07-24 @ 19:59) (56 - 73)  BP: 100/60 (08-07-24 @ 19:59) (95/67 - 107/83)  RR: 18 (08-07-24 @ 19:59) (14 - 22)  SpO2: 99% (08-07-24 @ 19:59) (96% - 100%)    General: well appearing; no distress  Cardiovascular: regular rate and rhythm, no murmurs, rubs or gallops appreciated  Respiratory: lungs clear to auscultation bilaterally  Abdominal: appropriately tender to palpation; incisions appear dry and intact with Dermabond  Pelvic: no spotting on pad  Extremities: no redness, tenderness, swelling or warmth on palpation    A/P:  Patient is s/p RA-SONIDO+BS, lysis of adhesions, SCP, cystoscopy, progressing appropriately post-operatively.     - Vital signs stable, afebrile, normotensive, normocardic   - Continue PRN ERAS pain medications  - Advance diet as tolerated  - Encouraged ambulation as tolerated and simethicone for flatus  - Valencia in place, plan for active TOV in AM  - DVT ppx: SCDs, Lovenox  - Home medications gabapentin 600 qhs, lacosamide 200 qd, synthroid 88 qd, citalopram 40 qd, zonegran 300 mg bid, Onfi 10 qam/20 qhs ordered  - Continue post- op management in AM

## 2024-08-08 ENCOUNTER — TRANSCRIPTION ENCOUNTER (OUTPATIENT)
Age: 49
End: 2024-08-08

## 2024-08-08 VITALS
RESPIRATION RATE: 18 BRPM | SYSTOLIC BLOOD PRESSURE: 99 MMHG | HEART RATE: 67 BPM | TEMPERATURE: 98 F | OXYGEN SATURATION: 99 % | DIASTOLIC BLOOD PRESSURE: 62 MMHG

## 2024-08-08 LAB
ANION GAP SERPL CALC-SCNC: 9 MMOL/L — SIGNIFICANT CHANGE UP (ref 5–17)
BLD GP AB SCN SERPL QL: SIGNIFICANT CHANGE UP
BUN SERPL-MCNC: 7.1 MG/DL — LOW (ref 8–20)
CALCIUM SERPL-MCNC: 7.5 MG/DL — LOW (ref 8.4–10.5)
CHLORIDE SERPL-SCNC: 108 MMOL/L — SIGNIFICANT CHANGE UP (ref 96–108)
CO2 SERPL-SCNC: 24 MMOL/L — SIGNIFICANT CHANGE UP (ref 22–29)
CREAT SERPL-MCNC: 0.62 MG/DL — SIGNIFICANT CHANGE UP (ref 0.5–1.3)
EGFR: 110 ML/MIN/1.73M2 — SIGNIFICANT CHANGE UP
GLUCOSE BLDC GLUCOMTR-MCNC: 89 MG/DL — SIGNIFICANT CHANGE UP (ref 70–99)
GLUCOSE SERPL-MCNC: 117 MG/DL — HIGH (ref 70–99)
HCT VFR BLD CALC: 33.3 % — LOW (ref 34.5–45)
HCT VFR BLD CALC: 34.3 % — LOW (ref 34.5–45)
HGB BLD-MCNC: 10.8 G/DL — LOW (ref 11.5–15.5)
HGB BLD-MCNC: 11.1 G/DL — LOW (ref 11.5–15.5)
MCHC RBC-ENTMCNC: 30.9 PG — SIGNIFICANT CHANGE UP (ref 27–34)
MCHC RBC-ENTMCNC: 31.1 PG — SIGNIFICANT CHANGE UP (ref 27–34)
MCHC RBC-ENTMCNC: 32.4 GM/DL — SIGNIFICANT CHANGE UP (ref 32–36)
MCHC RBC-ENTMCNC: 32.4 GM/DL — SIGNIFICANT CHANGE UP (ref 32–36)
MCV RBC AUTO: 95.5 FL — SIGNIFICANT CHANGE UP (ref 80–100)
MCV RBC AUTO: 96 FL — SIGNIFICANT CHANGE UP (ref 80–100)
PLATELET # BLD AUTO: 101 K/UL — LOW (ref 150–400)
PLATELET # BLD AUTO: 105 K/UL — LOW (ref 150–400)
POTASSIUM SERPL-MCNC: 3.3 MMOL/L — LOW (ref 3.5–5.3)
POTASSIUM SERPL-SCNC: 3.3 MMOL/L — LOW (ref 3.5–5.3)
RBC # BLD: 3.47 M/UL — LOW (ref 3.8–5.2)
RBC # BLD: 3.59 M/UL — LOW (ref 3.8–5.2)
RBC # FLD: 12.9 % — SIGNIFICANT CHANGE UP (ref 10.3–14.5)
RBC # FLD: 12.9 % — SIGNIFICANT CHANGE UP (ref 10.3–14.5)
SODIUM SERPL-SCNC: 141 MMOL/L — SIGNIFICANT CHANGE UP (ref 135–145)
WBC # BLD: 3.82 K/UL — SIGNIFICANT CHANGE UP (ref 3.8–10.5)
WBC # BLD: 4.07 K/UL — SIGNIFICANT CHANGE UP (ref 3.8–10.5)
WBC # FLD AUTO: 3.82 K/UL — SIGNIFICANT CHANGE UP (ref 3.8–10.5)
WBC # FLD AUTO: 4.07 K/UL — SIGNIFICANT CHANGE UP (ref 3.8–10.5)

## 2024-08-08 PROCEDURE — 85027 COMPLETE CBC AUTOMATED: CPT

## 2024-08-08 PROCEDURE — 36415 COLL VENOUS BLD VENIPUNCTURE: CPT

## 2024-08-08 PROCEDURE — 86850 RBC ANTIBODY SCREEN: CPT

## 2024-08-08 PROCEDURE — C1781: CPT

## 2024-08-08 PROCEDURE — 88307 TISSUE EXAM BY PATHOLOGIST: CPT

## 2024-08-08 PROCEDURE — C9399: CPT

## 2024-08-08 PROCEDURE — S2900: CPT

## 2024-08-08 PROCEDURE — 82962 GLUCOSE BLOOD TEST: CPT

## 2024-08-08 PROCEDURE — 86900 BLOOD TYPING SEROLOGIC ABO: CPT

## 2024-08-08 PROCEDURE — 80048 BASIC METABOLIC PNL TOTAL CA: CPT

## 2024-08-08 PROCEDURE — 86901 BLOOD TYPING SEROLOGIC RH(D): CPT

## 2024-08-08 RX ORDER — DEXTROSE MONOHYDRATE, SODIUM CHLORIDE, SODIUM LACTATE, CALCIUM CHLORIDE, MAGNESIUM CHLORIDE 1.5; 538; 448; 18.4; 5.08 G/100ML; MG/100ML; MG/100ML; MG/100ML; MG/100ML
1000 SOLUTION INTRAPERITONEAL ONCE
Refills: 0 | Status: COMPLETED | OUTPATIENT
Start: 2024-08-08 | End: 2024-08-08

## 2024-08-08 RX ORDER — ACETAMINOPHEN 500 MG
3 TABLET ORAL
Qty: 0 | Refills: 0 | DISCHARGE
Start: 2024-08-08

## 2024-08-08 RX ADMIN — Medication 975 MILLIGRAM(S): at 12:33

## 2024-08-08 RX ADMIN — Medication 88 MICROGRAM(S): at 06:03

## 2024-08-08 RX ADMIN — Medication 30 MILLIGRAM(S): at 06:03

## 2024-08-08 RX ADMIN — ZONISAMIDE 300 MILLIGRAM(S): 100 CAPSULE ORAL at 10:27

## 2024-08-08 RX ADMIN — ENOXAPARIN SODIUM 40 MILLIGRAM(S): 120 INJECTION SUBCUTANEOUS at 06:03

## 2024-08-08 RX ADMIN — Medication 975 MILLIGRAM(S): at 06:27

## 2024-08-08 RX ADMIN — LACOSAMIDE 200 MILLIGRAM(S): 50 TABLET, FILM COATED ORAL at 10:24

## 2024-08-08 RX ADMIN — CLOBAZAM 10 MILLIGRAM(S): 10 TABLET ORAL at 10:26

## 2024-08-08 RX ADMIN — Medication 40 MILLIGRAM(S): at 10:25

## 2024-08-08 RX ADMIN — DEXTROSE MONOHYDRATE, SODIUM CHLORIDE, SODIUM LACTATE, CALCIUM CHLORIDE, MAGNESIUM CHLORIDE 1000 MILLILITER(S): 1.5; 538; 448; 18.4; 5.08 SOLUTION INTRAPERITONEAL at 08:33

## 2024-08-08 NOTE — PROGRESS NOTE ADULT - ASSESSMENT
A/P: Patient is POD #1 s/p RA-SONIDO+BS, lysis of adhesions, SCP, cystoscopy, progressing appropriately post-operatively.   - Vital signs stable, afebrile, normotensive, normocardic   - Continue PRN ERAS pain medications  - PO diet  - Encouraged ambulation as tolerated and simethicone for flatus  - S/p aTOV: 350cc in, 325cc out  - DVT ppx: SCDs, Lovenox  - Home medications gabapentin 600 qhs, lacosamide 200 qd, synthroid 88 qd, citalopram 40 qd, zonegran 300 mg bid, Onfi 10 qam/20 qhs ordered    Dispo: Home today pending attending approval

## 2024-08-08 NOTE — DISCHARGE NOTE NURSING/CASE MANAGEMENT/SOCIAL WORK - PATIENT PORTAL LINK FT
You can access the FollowMyHealth Patient Portal offered by [facility] by registering at the following website: [email or web address]. By joining [facility]’s FollowMyHealth portal, you will also be able to view your health information using other applications (apps) compatible with our system.

## 2024-08-08 NOTE — PROGRESS NOTE ADULT - SUBJECTIVE AND OBJECTIVE BOX
BRAEDEN COHEN is a 49y F POD #1 s/p RA-SONIDO+BS, lysis of adhesions, SCP, cystoscopy.    She states pain is well controlled with PRN medication.  She is tolerating PO. She denies nausea/vomiting.   She has ambulated without issue. She denies lightheadedness/dizziness. She passed her active TOV this AM.     VITALS  T(C): 36.5 (08-07-24 @ 19:59), Max: 36.5 (08-07-24 @ 06:30)  HR: 61 (08-07-24 @ 19:59) (56 - 73)  BP: 100/60 (08-07-24 @ 19:59) (95/67 - 107/83)  RR: 18 (08-07-24 @ 19:59) (14 - 22)  SpO2: 99% (08-07-24 @ 19:59) (96% - 100%)    PHYSICAL EXAM  General: well appearing; no distress, AAOx3  Cardiovascular: regular rate and rhythm, no murmurs, rubs or gallops appreciated  Respiratory: lungs clear to auscultation bilaterally  Abdominal: appropriately tender to palpation; incisions appear dry and intact with Dermabond  Pelvic: no spotting on pad  Extremities: no redness, tenderness, swelling or warmth on palpation

## 2024-08-08 NOTE — CHART NOTE - NSCHARTNOTEFT_GEN_A_CORE
Rapid response called by patient's nurse after patient had her vitals performed with 2 consecutive low BPs of 70s/40s. Patient mentating well at the time and retained consciousness throughout.    Rapid response team came to the bedside. Dr. Glez OB  first to the bedside.   Patient AAOx3. Denies headache, lightheadedness, dizziness, shortness of breath, palpitations. Patient able to have conversation, denies pain.     Vital Signs Last 24 Hrs  T(C): 36.5 (08 Aug 2024 08:04), Max: 36.7 (08 Aug 2024 06:07)  T(F): 97.7 (08 Aug 2024 08:04), Max: 98.1 (08 Aug 2024 06:07)  HR: 63 (08 Aug 2024 08:04) (56 - 73)  BP: 78/48 (08 Aug 2024 08:04) (78/48 - 105/67)  BP(mean): 76 (07 Aug 2024 19:00) (76 - 79)  RR: 18 (08 Aug 2024 08:04) (14 - 22)  SpO2: 95% (08 Aug 2024 08:04) (95% - 100%)    Parameters below as of 08 Aug 2024 06:07  Patient On (Oxygen Delivery Method): room air    General: AAOx3, well appearing  Cardiac: RRR  Respiratory: normal respiratory effort  Abdomen: soft, non-distended, no rebound or guarding.  Pelvic: no bleeding on pad below  Psych: calm, cooperative                          10.8   4.07  )-----------( 105      ( 08 Aug 2024 05:03 )             33.3       Patient is POD1 from a RA-SONIDO, SCP, AMBAR with urogynecology that was uncomplicated. Patient's Hb this morning appropriate drop 12.9>10.8. Adequate UOP overnight and passed TOV. No signs of internal bleeding.   -Patient placed on telemetry   -Rapid response team responded to call  -Stat CBC and T&S ordered  -Patient clinically stable at this time  -1L LR bolus ordered  -Will continue to monitor patient vitals and follow up on stat labs  -Private attending notified Rapid response called by patient's nurse after patient had her vitals performed with 2 consecutive low BPs of 70s/40s. Patient mentating well at the time and retained consciousness throughout.    Rapid response team came to the bedside. Dr. Glez OB  first to the bedside.   Patient AAOx3. Denies headache, lightheadedness, dizziness, shortness of breath, palpitations. Patient able to have conversation, denies pain.     Vital Signs Last 24 Hrs  T(C): 36.5 (08 Aug 2024 08:04), Max: 36.7 (08 Aug 2024 06:07)  T(F): 97.7 (08 Aug 2024 08:04), Max: 98.1 (08 Aug 2024 06:07)  HR: 63 (08 Aug 2024 08:04) (56 - 73)  BP: 78/48 (08 Aug 2024 08:04) (78/48 - 105/67)  BP(mean): 76 (07 Aug 2024 19:00) (76 - 79)  RR: 18 (08 Aug 2024 08:04) (14 - 22)  SpO2: 95% (08 Aug 2024 08:04) (95% - 100%)    Parameters below as of 08 Aug 2024 06:07  Patient On (Oxygen Delivery Method): room air    General: AAOx3, well appearing  Cardiac: RRR  Respiratory: normal respiratory effort  Abdomen: soft, non-distended, no rebound or guarding.  Pelvic: no bleeding on pad below  Psych: calm, cooperative                          10.8   4.07  )-----------( 105      ( 08 Aug 2024 05:03 )             33.3       Patient is POD1 from a RA-SONIDO, SCP, AMBAR with urogynecology that was uncomplicated. Patient's Hb this morning appropriate drop 12.9>10.8. Adequate UOP overnight and passed TOV. No signs of internal bleeding.   -Patient placed on telemetry   -Rapid response team responded to call  -Stat CBC and T&S ordered  -Patient clinically stable at this time  -1L LR bolus ordered  -Will continue to monitor patient vitals and follow up on stat labs  -Private attending notified    Attestation: Agree with above, patient shows no s/s of intra-abdominal bleeding or cardiac dysfunction, feels well. Attending made aware.

## 2024-08-08 NOTE — CHART NOTE - NSCHARTNOTEFT_GEN_A_CORE
Rapid response called for asymptomatic hypotension. Pt is A&Ox4 in NAD, laying comfortably. She denies hematuria, hematochezia/melena, and/or hematemesis.     Pt is a 47 y/o female s/p robotic-assisted supracervical hysterectomy, bilateral salpingectomy, lysis of adhesions, sacrocolpopexy, and cystoscopy. She is POD#1.       Medicine team recommendations   - Fluid resuscitation. Currently receiving 1L LR.  - CBC & T&S STAT   - Continue w/ close monitoring      Rest of care per primary team

## 2024-08-15 LAB — SURGICAL PATHOLOGY STUDY: SIGNIFICANT CHANGE UP

## 2024-08-26 ENCOUNTER — APPOINTMENT (OUTPATIENT)
Dept: UROGYNECOLOGY | Facility: CLINIC | Age: 49
End: 2024-08-26
Payer: COMMERCIAL

## 2024-08-26 VITALS
DIASTOLIC BLOOD PRESSURE: 73 MMHG | HEART RATE: 60 BPM | OXYGEN SATURATION: 99 % | TEMPERATURE: 98 F | SYSTOLIC BLOOD PRESSURE: 109 MMHG

## 2024-08-26 DIAGNOSIS — Z98.890 OTHER SPECIFIED POSTPROCEDURAL STATES: ICD-10-CM

## 2024-08-26 LAB
BILIRUB UR QL STRIP: NORMAL
CLARITY UR: CLEAR
COLLECTION METHOD: NORMAL
GLUCOSE UR-MCNC: NEGATIVE
HCG UR QL: 0.2 EU/DL
HGB UR QL STRIP.AUTO: NEGATIVE
KETONES UR-MCNC: NORMAL
LEUKOCYTE ESTERASE UR QL STRIP: NORMAL
NITRITE UR QL STRIP: NEGATIVE
PH UR STRIP: 6
PROT UR STRIP-MCNC: NEGATIVE
SP GR UR STRIP: 1.03

## 2024-08-26 PROCEDURE — 81003 URINALYSIS AUTO W/O SCOPE: CPT | Mod: QW

## 2024-08-26 PROCEDURE — 51798 US URINE CAPACITY MEASURE: CPT

## 2024-08-26 PROCEDURE — 99024 POSTOP FOLLOW-UP VISIT: CPT

## 2024-08-26 NOTE — SUBJECTIVE
[FreeTextEntry1] : slowly getting better  [FreeTextEntry8] : no changes  [FreeTextEntry7] : crampy  [FreeTextEntry6] : improving originally with decreased no n/v [FreeTextEntry5] : no leakage of urine with laugh cough or sneeze, good urine flow empties well no urgency no urge incontinence  [FreeTextEntry4] : last bm this am taking diuretic tea  [FreeTextEntry3] : good  [FreeTextEntry2] : fine

## 2024-08-26 NOTE — ASSESSMENT
[FreeTextEntry1] : 48y/o female s/p robotic surgery with Dr. Osuna at [facility] on August 7,2024 pt s/p robotic lysis of adhesions, [place] bs,scp cystoscopy. Op report reviewed  We discussed a bowel regimen and the importance of continuing the stool softeners as needed ( mag o7 or Miralax)  pt to follow up in one month appointment confirmed

## 2024-08-26 NOTE — ASSESSMENT
[FreeTextEntry1] : 50y/o female s/p robotic surgery with Dr. Osuna at [facility] on August 7,2024 pt s/p robotic lysis of adhesions, [place] bs,scp cystoscopy. Op report reviewed  We discussed a bowel regimen and the importance of continuing the stool softeners as needed ( mag o7 or Miralax)  pt to follow up in one month appointment confirmed

## 2024-09-12 ENCOUNTER — RX RENEWAL (OUTPATIENT)
Age: 49
End: 2024-09-12

## 2024-09-25 ENCOUNTER — APPOINTMENT (OUTPATIENT)
Dept: UROGYNECOLOGY | Facility: CLINIC | Age: 49
End: 2024-09-25
Payer: COMMERCIAL

## 2024-09-25 VITALS
SYSTOLIC BLOOD PRESSURE: 112 MMHG | DIASTOLIC BLOOD PRESSURE: 72 MMHG | WEIGHT: 166 LBS | BODY MASS INDEX: 26.68 KG/M2 | HEART RATE: 61 BPM | HEIGHT: 66 IN | TEMPERATURE: 97.5 F

## 2024-09-25 PROCEDURE — 99024 POSTOP FOLLOW-UP VISIT: CPT

## 2024-09-25 PROCEDURE — 51798 US URINE CAPACITY MEASURE: CPT

## 2024-09-25 NOTE — OBJECTIVE
[Post Void Residual ____ ml] : Post Void Residual was [unfilled] ml [Soft and Nontender] : soft and nontender [Clean, Dry, Intact] : Clean, Dry, Intact [Good Support] : Good support [Healing well] : healing well [No Masses or Tenderness] : no masses or tenderness [FreeTextEntry3] : no mesh exposure, no sutures, good reduction of POP

## 2024-09-25 NOTE — SUBJECTIVE
[FreeTextEntry1] : happy with surgery and doing well, no fever or chills, no vaginal discharge or bleeding [FreeTextEntry7] : reduced, none [FreeTextEntry6] : no N/V, normal [FreeTextEntry5] : no leakage, no hematuria, no incomplete emptying, no dysuria [FreeTextEntry4] : denies straining

## 2024-09-25 NOTE — ADDENDUM
[FreeTextEntry1] : This note was written by Tiffanie Martínez, acting as the  for Dr. Osuna. This note accurately reflects the work and decisions made by Dr. Osuna.

## 2024-09-25 NOTE — DISCUSSION/SUMMARY
[FreeTextEntry1] : BRAEDEN is a 49 year female who presents for f/u s/p robotic lysis of adhesions SONIDO BS SCP cysto on 08/07/2024. Last seen in office on 08/26/2024 for scheduled post-op visit. Doing well today with normal exam. RTO 1 year or prn. Precautions reviewed. All ques answered.

## 2024-12-16 ENCOUNTER — RX RENEWAL (OUTPATIENT)
Age: 49
End: 2024-12-16

## 2025-06-03 ENCOUNTER — APPOINTMENT (OUTPATIENT)
Dept: FAMILY MEDICINE | Facility: CLINIC | Age: 50
End: 2025-06-03
Payer: COMMERCIAL

## 2025-06-03 DIAGNOSIS — R39.9 UNSPECIFIED SYMPTOMS AND SIGNS INVOLVING THE GENITOURINARY SYSTEM: ICD-10-CM

## 2025-06-03 LAB
BILIRUB UR QL STRIP: NORMAL
CLARITY UR: NORMAL
COLLECTION METHOD: NORMAL
GLUCOSE UR-MCNC: NORMAL
HCG UR QL: 0.2 EU/DL
HGB UR QL STRIP.AUTO: NORMAL
KETONES UR-MCNC: NORMAL
LEUKOCYTE ESTERASE UR QL STRIP: NORMAL
NITRITE UR QL STRIP: NORMAL
PH UR STRIP: 6.5
PROT UR STRIP-MCNC: NORMAL
SP GR UR STRIP: 1.03

## 2025-06-03 PROCEDURE — 99213 OFFICE O/P EST LOW 20 MIN: CPT

## 2025-06-03 RX ORDER — PHENAZOPYRIDINE 200 MG/1
200 TABLET, FILM COATED ORAL 3 TIMES DAILY
Qty: 9 | Refills: 0 | Status: ACTIVE | COMMUNITY
Start: 2025-06-03 | End: 1900-01-01

## 2025-06-04 LAB
APPEARANCE: CLEAR
BACTERIA: ABNORMAL /HPF
BILIRUBIN URINE: NEGATIVE
BLOOD URINE: NEGATIVE
CAST: 1 /LPF
COLOR: YELLOW
EPITHELIAL CELLS: 12 /HPF
GLUCOSE QUALITATIVE U: NEGATIVE MG/DL
KETONES URINE: NEGATIVE MG/DL
LEUKOCYTE ESTERASE URINE: ABNORMAL
MICROSCOPIC-UA: NORMAL
NITRITE URINE: NEGATIVE
PH URINE: 6.5
PROTEIN URINE: NEGATIVE MG/DL
RED BLOOD CELLS URINE: 1 /HPF
REVIEW: NORMAL
SPECIFIC GRAVITY URINE: 1.02
UROBILINOGEN URINE: 0.2 MG/DL
WHITE BLOOD CELLS URINE: 2 /HPF

## 2025-06-05 ENCOUNTER — APPOINTMENT (OUTPATIENT)
Dept: OBGYN | Facility: CLINIC | Age: 50
End: 2025-06-05

## 2025-06-06 LAB — BACTERIA UR CULT: NORMAL

## 2025-08-27 RX ORDER — SULFAMETHOXAZOLE AND TRIMETHOPRIM 800; 160 MG/1; MG/1
800-160 TABLET ORAL TWICE DAILY
Qty: 14 | Refills: 1 | Status: ACTIVE | COMMUNITY
Start: 2025-08-27 | End: 1900-01-01

## 2025-09-02 ENCOUNTER — APPOINTMENT (OUTPATIENT)
Dept: UROGYNECOLOGY | Facility: CLINIC | Age: 50
End: 2025-09-02

## 2025-09-02 PROCEDURE — 99213 OFFICE O/P EST LOW 20 MIN: CPT

## 2025-09-04 ENCOUNTER — APPOINTMENT (OUTPATIENT)
Dept: UROLOGY | Facility: CLINIC | Age: 50
End: 2025-09-04
Payer: COMMERCIAL

## 2025-09-04 VITALS
TEMPERATURE: 97.7 F | HEIGHT: 66 IN | BODY MASS INDEX: 27 KG/M2 | WEIGHT: 168 LBS | SYSTOLIC BLOOD PRESSURE: 102 MMHG | HEART RATE: 61 BPM | DIASTOLIC BLOOD PRESSURE: 68 MMHG

## 2025-09-04 DIAGNOSIS — R35.0 FREQUENCY OF MICTURITION: ICD-10-CM

## 2025-09-04 LAB
APPEARANCE: CLEAR
BACTERIA UR CULT: NORMAL
BILIRUBIN URINE: NEGATIVE
BLOOD URINE: NEGATIVE
COLOR: YELLOW
GLUCOSE QUALITATIVE U: NEGATIVE
KETONES URINE: NEGATIVE
LEUKOCYTE ESTERASE URINE: NEGATIVE
NITRITE URINE: NEGATIVE
PH URINE: 6
PROTEIN URINE: NEGATIVE
SPECIFIC GRAVITY URINE: 1.01
UROBILINOGEN URINE: 0.2 (ref 0.2–?)

## 2025-09-04 PROCEDURE — 99203 OFFICE O/P NEW LOW 30 MIN: CPT

## 2025-09-05 DIAGNOSIS — N89.8 OTHER SPECIFIED NONINFLAMMATORY DISORDERS OF VAGINA: ICD-10-CM

## 2025-09-05 RX ORDER — ESTRADIOL 0.1 MG/G
0.1 CREAM VAGINAL
Qty: 1 | Refills: 3 | Status: ACTIVE | COMMUNITY
Start: 2025-09-05 | End: 1900-01-01

## 2025-09-09 ENCOUNTER — APPOINTMENT (OUTPATIENT)
Dept: OBGYN | Facility: CLINIC | Age: 50
End: 2025-09-09

## 2025-09-16 ENCOUNTER — NON-APPOINTMENT (OUTPATIENT)
Age: 50
End: 2025-09-16

## (undated) DEVICE — SUT VICRYL 2-0 27" SH UNDYED

## (undated) DEVICE — ENDOCATCH 10MM SPECIMEN POUCH

## (undated) DEVICE — XI ARM NEEDLE DRIVER SUTURECUT MEGA 8MM

## (undated) DEVICE — GLV 8.5 PROTEXIS (WHITE)

## (undated) DEVICE — PREP DYNA-HEX CHG 4% 4OZ BOTTLE (BACTOSHIELD)

## (undated) DEVICE — SUT ETHIBOND 0 36" SH DA

## (undated) DEVICE — PREP CHLORAPREP HI-LITE ORANGE 26ML

## (undated) DEVICE — POSITIONER PINK PAD PIGAZZI SYSTEM

## (undated) DEVICE — SUT MONOCRYL 4-0 27" PS-2 UNDYED

## (undated) DEVICE — TUBING AIRSEAL TRI-LUMEN FILTERED

## (undated) DEVICE — WARMING BLANKET UPPER ADULT

## (undated) DEVICE — SUT VLOC 180 2-0 6" GS-22 GREEN

## (undated) DEVICE — TUBING CONNECTING 6MM 20FT

## (undated) DEVICE — PACK ROBOTIC

## (undated) DEVICE — SOL IRR POUR H2O 1000ML

## (undated) DEVICE — SOL IRR POUR NS 0.9% 1000ML

## (undated) DEVICE — XI ARM SCISSOR MONO CURVED

## (undated) DEVICE — TROCAR SURGIQUEST AIRSEAL 8MMX100MM

## (undated) DEVICE — SUT GORETEX CV-2 (0) 36" THX-26 DA

## (undated) DEVICE — DRAPE ROBOTIC

## (undated) DEVICE — SUT VICRYL 0 27" UR-6

## (undated) DEVICE — XI OBTURATOR OPTICAL BLADELESS 8MM

## (undated) DEVICE — XI SEAL UNIVERSIAL 5-12MM

## (undated) DEVICE — VENODYNE/SCD SLEEVE CALF MEDIUM

## (undated) DEVICE — TIP METZENBAUM SCISSOR MONOPOLAR ENDOCUT (ORANGE)

## (undated) DEVICE — PACK MINOR WITH LAP

## (undated) DEVICE — XI CORD MONOPOLAR CAUTERY (GREEN)

## (undated) DEVICE — PREP TRAY DRY SKIN PREP SCRUB

## (undated) DEVICE — XI CORD BIPOLAR CAUTERY (BLUE)

## (undated) DEVICE — DRSG DERMABOND 0.7ML

## (undated) DEVICE — ELCTR GROUNDING PAD ADULT COVIDIEN

## (undated) DEVICE — PACK GENERAL LAPAROSCOPY

## (undated) DEVICE — DRAPE GYN W LEGGINGS 3X125"

## (undated) DEVICE — XI ARM GRASPER TIP UP FENESTRATED

## (undated) DEVICE — XI ARM FORCEP MARYLAND BIPOLAR

## (undated) DEVICE — XI ARM FORCEP TENACULUM

## (undated) DEVICE — LONE STAR ELASTIC STAY HOOK 5MM SHARP

## (undated) DEVICE — GLV 8 PROTEXIS (WHITE)

## (undated) DEVICE — LONE STAR RETRACTOR RING 32.5CM X 18.3CM DISP

## (undated) DEVICE — XI DRAPE ARM

## (undated) DEVICE — XI TIP COVER

## (undated) DEVICE — D HELP - CLEARVIEW CLEARIFY SYSTEM

## (undated) DEVICE — GOWN XXXL

## (undated) DEVICE — XI DRAPE COLUMN